# Patient Record
Sex: FEMALE | Race: WHITE | Employment: UNEMPLOYED | URBAN - NONMETROPOLITAN AREA
[De-identification: names, ages, dates, MRNs, and addresses within clinical notes are randomized per-mention and may not be internally consistent; named-entity substitution may affect disease eponyms.]

---

## 2019-06-06 ENCOUNTER — HOSPITAL ENCOUNTER (OUTPATIENT)
Dept: INFUSION THERAPY | Age: 54
Discharge: HOME | End: 2019-06-06
Payer: COMMERCIAL

## 2019-06-06 PROCEDURE — 86300 IMMUNOASSAY TUMOR CA 15-3: CPT

## 2019-06-06 PROCEDURE — 80053 COMPREHEN METABOLIC PANEL: CPT

## 2019-06-06 PROCEDURE — 36415 COLL VENOUS BLD VENIPUNCTURE: CPT

## 2019-06-14 ENCOUNTER — HOSPITAL ENCOUNTER (OUTPATIENT)
Dept: INFUSION THERAPY | Age: 54
Discharge: HOME | End: 2019-06-14
Payer: COMMERCIAL

## 2019-06-14 PROCEDURE — 85025 COMPLETE CBC W/AUTO DIFF WBC: CPT

## 2019-06-26 ENCOUNTER — OFFICE VISIT (OUTPATIENT)
Dept: OBSTETRICS AND GYNECOLOGY | Facility: CLINIC | Age: 54
End: 2019-06-26

## 2019-06-26 VITALS
BODY MASS INDEX: 24.17 KG/M2 | WEIGHT: 128 LBS | SYSTOLIC BLOOD PRESSURE: 102 MMHG | DIASTOLIC BLOOD PRESSURE: 68 MMHG | HEIGHT: 61 IN

## 2019-06-26 DIAGNOSIS — R53.83 FATIGUE, UNSPECIFIED TYPE: ICD-10-CM

## 2019-06-26 DIAGNOSIS — E55.9 VITAMIN D DEFICIENCY: ICD-10-CM

## 2019-06-26 DIAGNOSIS — Z12.4 CERVICAL CANCER SCREENING: ICD-10-CM

## 2019-06-26 DIAGNOSIS — R51.9 NONINTRACTABLE HEADACHE, UNSPECIFIED CHRONICITY PATTERN, UNSPECIFIED HEADACHE TYPE: ICD-10-CM

## 2019-06-26 DIAGNOSIS — Z01.419 WELL WOMAN EXAM WITH ROUTINE GYNECOLOGICAL EXAM: Primary | ICD-10-CM

## 2019-06-26 PROBLEM — C80.1 CARCINOSARCOMA (HCC): Status: ACTIVE | Noted: 2019-06-26

## 2019-06-26 PROCEDURE — 99396 PREV VISIT EST AGE 40-64: CPT | Performed by: NURSE PRACTITIONER

## 2019-06-26 PROCEDURE — G0123 SCREEN CERV/VAG THIN LAYER: HCPCS | Performed by: NURSE PRACTITIONER

## 2019-06-26 RX ORDER — FAMOTIDINE 20 MG/1
TABLET, FILM COATED ORAL
Refills: 1 | COMMUNITY
Start: 2019-05-02 | End: 2019-08-20 | Stop reason: ALTCHOICE

## 2019-06-26 RX ORDER — SUCRALFATE 1 G/1
TABLET ORAL
Refills: 5 | COMMUNITY
Start: 2019-04-24 | End: 2021-07-26

## 2019-06-26 RX ORDER — MELATONIN
1000 DAILY
COMMUNITY

## 2019-06-26 RX ORDER — PANTOPRAZOLE SODIUM 40 MG/1
TABLET, DELAYED RELEASE ORAL
Refills: 5 | COMMUNITY
Start: 2019-04-25 | End: 2019-08-20 | Stop reason: ALTCHOICE

## 2019-06-26 RX ORDER — M-VIT,TX,IRON,MINS/CALC/FOLIC 27MG-0.4MG
1 TABLET ORAL
COMMUNITY

## 2019-06-26 RX ORDER — UBIDECARENONE 100 MG
100 CAPSULE ORAL DAILY
COMMUNITY
End: 2021-07-26

## 2019-06-26 NOTE — PROGRESS NOTES
Jailyn Dickson is a 54 y.o. female  YOB: 1965        Chief Complaint   Patient presents with   • Gynecologic Exam     New patient here today for yearly exam. Last exam was done 04/2018 in [place] and was normal per patient. Patient has mammograms done at [facility] which are managed by Dr Liang. Patient voices no complaints or concerns today.        Gynecologic Exam   The patient's pertinent negatives include no genital itching, genital lesions, genital odor, genital rash, missed menses, pelvic pain, vaginal bleeding or vaginal discharge. Pertinent negatives include no abdominal pain, back pain, constipation, diarrhea, dysuria, fever, frequency, hematuria, nausea, rash, sore throat, urgency or vomiting.       The following portions of the patient's history were reviewed and updated as appropriate: allergies, current medications, past family history, past medical history, past social history, past surgical history and problem list.    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       Family History   Problem Relation Age of Onset   • Breast cancer Maternal Aunt    • Ovarian cancer Neg Hx    • Uterine cancer Neg Hx    • Colon cancer Neg Hx    • Melanoma Neg Hx        Social History     Socioeconomic History   • Marital status:      Spouse name: Not on file   • Number of children: Not on file   • Years of education: Not on file   • Highest education level: Not on file   Tobacco Use   • Smoking status: Never Smoker   • Smokeless tobacco: Never Used   Substance and Sexual Activity   • Alcohol use: No     Frequency: Never   • Drug use: No   • Sexual activity: Defer         Current Outpatient Medications:   •  cholecalciferol (VITAMIN D3) 1000 units tablet, Take 1,000 Units by mouth Daily., Disp: , Rfl:    •  coenzyme Q10 100 MG capsule, Take 100 mg by mouth Daily., Disp: , Rfl:   •  Evening Primrose Oil 1000 MG capsule, Take  by mouth., Disp: , Rfl:   •  famotidine (PEPCID) 20 MG tablet, , Disp: , Rfl: 1  •  pantoprazole (PROTONIX) 40 MG EC tablet, , Disp: , Rfl: 5  •  sucralfate (CARAFATE) 1 g tablet, , Disp: , Rfl: 5  •  therapeutic multivitamin-minerals (THERAGRAN-M) tablet, Take 1 tablet by mouth., Disp: , Rfl:     No LMP recorded. Patient is postmenopausal.    Sexual History:         Could not be calculated    Past Surgical History:   Procedure Laterality Date   • BREAST LUMPECTOMY Left    • CERVICAL CONIZATION, LEEP     •  SECTION      x2   • CHOLECYSTECTOMY     • PORTACATH PLACEMENT     • TONSILLECTOMY         Review of Systems   Constitutional: Negative for activity change, appetite change, fatigue, fever, unexpected weight gain and unexpected weight loss.   HENT: Negative for congestion, ear pain, hearing loss, nosebleeds, rhinorrhea, sore throat, tinnitus and trouble swallowing.    Eyes: Negative for blurred vision, pain, discharge, itching and visual disturbance.   Respiratory: Negative for apnea, chest tightness, shortness of breath and wheezing.    Cardiovascular: Negative for chest pain and leg swelling.   Gastrointestinal: Negative for abdominal pain, blood in stool, constipation, diarrhea, nausea, vomiting and GERD.   Endocrine: Negative for heat intolerance, polydipsia and polyuria.   Genitourinary: Negative for urinary incontinence, decreased libido, difficulty urinating, dyspareunia, dysuria, frequency, genital sores, hematuria, menstrual problem, missed menses, pelvic pain, urgency, vaginal discharge, vaginal pain and breast lump.   Musculoskeletal: Negative for arthralgias, back pain, joint swelling and myalgias.   Skin: Negative for color change, rash and skin lesions.   Allergic/Immunologic: Negative for environmental allergies, food allergies and immunocompromised state.    Neurological: Negative for dizziness, tremors, seizures, syncope, facial asymmetry, numbness and headache.   Hematological: Negative for adenopathy. Does not bruise/bleed easily.   Psychiatric/Behavioral: Negative for agitation, hallucinations, sleep disturbance, suicidal ideas and depressed mood. The patient is not nervous/anxious.        Objective   Physical Exam   Constitutional: She is oriented to person, place, and time. She appears well-developed and well-nourished. No distress.   HENT:   Head: Normocephalic.   Right Ear: External ear normal.   Left Ear: External ear normal.   Nose: Nose normal.   Mouth/Throat: Oropharynx is clear and moist.   Eyes: Conjunctivae are normal. Right eye exhibits no discharge. Left eye exhibits no discharge. No scleral icterus.   Neck: Normal range of motion. Neck supple. Carotid bruit is not present. No tracheal deviation present. No thyromegaly present.   Cardiovascular: Normal rate, regular rhythm, normal heart sounds and intact distal pulses.   No murmur heard.  Pulmonary/Chest: Effort normal and breath sounds normal. No respiratory distress. She has no wheezes. Right breast exhibits no inverted nipple, no mass, no nipple discharge, no skin change and no tenderness. Left breast exhibits no inverted nipple, no mass, no nipple discharge, no skin change and no tenderness. Breasts are symmetrical. There is no breast swelling.   Abdominal: Soft. She exhibits no distension and no mass. There is no tenderness. There is no guarding. No hernia. Hernia confirmed negative in the right inguinal area and confirmed negative in the left inguinal area.   Genitourinary: Rectum normal, vagina normal and uterus normal. Rectal exam shows no mass. No breast tenderness, discharge or bleeding. Pelvic exam was performed with patient supine. There is no rash, tenderness, lesion or injury on the right labia. There is no rash, tenderness, lesion or injury on the left labia. Uterus is not enlarged, not  "fixed and not tender. Cervix exhibits no motion tenderness, no discharge and no friability. Right adnexum displays no mass, no tenderness and no fullness. Left adnexum displays no mass, no tenderness and no fullness. No erythema, tenderness or bleeding in the vagina. No foreign body in the vagina. No signs of injury around the vagina. No vaginal discharge found.   Genitourinary Comments:   BSU normal  Urethral meatus  Normal  Perineum  Normal   Musculoskeletal: Normal range of motion. She exhibits no edema or tenderness.   Lymphadenopathy:        Head (right side): No submental, no submandibular, no tonsillar, no preauricular, no posterior auricular and no occipital adenopathy present.        Head (left side): No submental, no submandibular, no tonsillar, no preauricular, no posterior auricular and no occipital adenopathy present.     She has no cervical adenopathy.        Right cervical: No superficial cervical, no deep cervical and no posterior cervical adenopathy present.       Left cervical: No superficial cervical, no deep cervical and no posterior cervical adenopathy present.     She has no axillary adenopathy.        Right: No inguinal adenopathy present.        Left: No inguinal adenopathy present.   Neurological: She is alert and oriented to person, place, and time. Coordination normal.   Skin: Skin is warm and dry. No bruising and no rash noted. She is not diaphoretic. No erythema.   Psychiatric: She has a normal mood and affect. Her behavior is normal. Judgment and thought content normal.   Nursing note and vitals reviewed.        Vitals:    06/26/19 1357   BP: 102/68   Weight: 58.1 kg (128 lb)   Height: 153.7 cm (60.5\")       Rebekah was seen today for gynecologic exam.    Diagnoses and all orders for this visit:    Well woman exam with routine gynecological exam  Comments:  New patient here today for well woman exam.  Well woman exam normal.  ThinPrep Pap smear done.  Mammogram ordered and managed by Dr." "Azul.  Orders:  -     Liquid-based Pap Smear, Screening  -     Vitamin D 25 Hydroxy  -     T4, Free  -     Hemoglobin A1c  -     CBC & Differential  -     Comprehensive Metabolic Panel  -     Lipid Panel With LDL / HDL Ratio  -     TSH    Cervical cancer screening  Comments:  ThinPrep Pap smear done.  Orders:  -     Liquid-based Pap Smear, Screening    Nonintractable headache, unspecified chronicity pattern, unspecified headache type  Comments:  Patient reports she has had \"head pain\" for years.  Requests referral to neurologist.  Referral made.  Orders:  -     Ambulatory Referral to Neurology    Vitamin D deficiency  Comments:  Vitamin D level checked today.  Orders:  -     Vitamin D 25 Hydroxy    Fatigue, unspecified type  Comments:  Yearly lab panel done-follow-up pending results.  Orders:  -     Vitamin D 25 Hydroxy  -     T4, Free  -     Hemoglobin A1c  -     CBC & Differential  -     Comprehensive Metabolic Panel  -     Lipid Panel With LDL / HDL Ratio  -     TSH          Patient's Body mass index is 24.59 kg/m². BMI is within normal parameters. No follow-up required..             Non-Smoker    MyChart Instructions Given       "

## 2019-06-27 LAB
25(OH)D3+25(OH)D2 SERPL-MCNC: 43.9 NG/ML (ref 30–100)
ALBUMIN SERPL-MCNC: 4.7 G/DL (ref 3.5–5.2)
ALBUMIN/GLOB SERPL: 1.6 G/DL
ALP SERPL-CCNC: 77 U/L (ref 39–117)
ALT SERPL-CCNC: 18 U/L (ref 1–33)
AST SERPL-CCNC: 18 U/L (ref 1–32)
BASOPHILS # BLD AUTO: 0.05 10*3/MM3 (ref 0–0.2)
BASOPHILS NFR BLD AUTO: 0.6 % (ref 0–1.5)
BILIRUB SERPL-MCNC: 1 MG/DL (ref 0.2–1.2)
BUN SERPL-MCNC: 19 MG/DL (ref 6–20)
BUN/CREAT SERPL: 29.7 (ref 7–25)
CALCIUM SERPL-MCNC: 10 MG/DL (ref 8.6–10.5)
CHLORIDE SERPL-SCNC: 103 MMOL/L (ref 98–107)
CHOLEST SERPL-MCNC: 212 MG/DL (ref 0–200)
CO2 SERPL-SCNC: 27.2 MMOL/L (ref 22–29)
CREAT SERPL-MCNC: 0.64 MG/DL (ref 0.57–1)
EOSINOPHIL # BLD AUTO: 0.13 10*3/MM3 (ref 0–0.4)
EOSINOPHIL NFR BLD AUTO: 1.6 % (ref 0.3–6.2)
ERYTHROCYTE [DISTWIDTH] IN BLOOD BY AUTOMATED COUNT: 13.4 % (ref 12.3–15.4)
GEN CATEG CVX/VAG CYTO-IMP: NORMAL
GLOBULIN SER CALC-MCNC: 3 GM/DL
GLUCOSE SERPL-MCNC: 85 MG/DL (ref 65–99)
HBA1C MFR BLD: 5.6 % (ref 4.8–5.6)
HCT VFR BLD AUTO: 41.9 % (ref 34–46.6)
HDLC SERPL-MCNC: 57 MG/DL (ref 40–60)
HGB BLD-MCNC: 12.7 G/DL (ref 12–15.9)
IMM GRANULOCYTES # BLD AUTO: 0 10*3/MM3 (ref 0–0.05)
IMM GRANULOCYTES NFR BLD AUTO: 0 % (ref 0–0.5)
LAB AP CASE REPORT: NORMAL
LAB AP GYN ADDITIONAL INFORMATION: NORMAL
LAB AP GYN OTHER FINDINGS: NORMAL
LDLC SERPL CALC-MCNC: 131 MG/DL (ref 0–100)
LDLC/HDLC SERPL: 2.3 {RATIO}
LYMPHOCYTES # BLD AUTO: 3.31 10*3/MM3 (ref 0.7–3.1)
LYMPHOCYTES NFR BLD AUTO: 40.5 % (ref 19.6–45.3)
MCH RBC QN AUTO: 27.8 PG (ref 26.6–33)
MCHC RBC AUTO-ENTMCNC: 30.3 G/DL (ref 31.5–35.7)
MCV RBC AUTO: 91.7 FL (ref 79–97)
MONOCYTES # BLD AUTO: 0.6 10*3/MM3 (ref 0.1–0.9)
MONOCYTES NFR BLD AUTO: 7.3 % (ref 5–12)
NEUTROPHILS # BLD AUTO: 4.09 10*3/MM3 (ref 1.7–7)
NEUTROPHILS NFR BLD AUTO: 50 % (ref 42.7–76)
PATH INTERP SPEC-IMP: NORMAL
PLATELET # BLD AUTO: 331 10*3/MM3 (ref 140–450)
POTASSIUM SERPL-SCNC: 4.1 MMOL/L (ref 3.5–5.2)
PROT SERPL-MCNC: 7.7 G/DL (ref 6–8.5)
RBC # BLD AUTO: 4.57 10*6/MM3 (ref 3.77–5.28)
SODIUM SERPL-SCNC: 143 MMOL/L (ref 136–145)
STAT OF ADQ CVX/VAG CYTO-IMP: NORMAL
T4 FREE SERPL-MCNC: 1.16 NG/DL (ref 0.93–1.7)
TRIGL SERPL-MCNC: 120 MG/DL (ref 0–150)
TSH SERPL DL<=0.005 MIU/L-ACNC: 1.05 MIU/ML (ref 0.27–4.2)
VLDLC SERPL CALC-MCNC: 24 MG/DL
WBC # BLD AUTO: 8.18 10*3/MM3 (ref 3.4–10.8)

## 2019-08-05 ENCOUNTER — TELEPHONE (OUTPATIENT)
Dept: NEUROLOGY | Age: 54
End: 2019-08-05

## 2019-08-20 ENCOUNTER — OFFICE VISIT (OUTPATIENT)
Dept: CARDIOLOGY | Facility: CLINIC | Age: 54
End: 2019-08-20

## 2019-08-20 ENCOUNTER — DOCUMENTATION (OUTPATIENT)
Dept: CARDIOLOGY | Facility: CLINIC | Age: 54
End: 2019-08-20

## 2019-08-20 VITALS
WEIGHT: 133 LBS | BODY MASS INDEX: 25.11 KG/M2 | HEART RATE: 70 BPM | SYSTOLIC BLOOD PRESSURE: 104 MMHG | DIASTOLIC BLOOD PRESSURE: 60 MMHG | HEIGHT: 61 IN

## 2019-08-20 DIAGNOSIS — R07.89 CHEST PAIN, ATYPICAL: Primary | ICD-10-CM

## 2019-08-20 PROCEDURE — 93000 ELECTROCARDIOGRAM COMPLETE: CPT | Performed by: INTERNAL MEDICINE

## 2019-08-20 PROCEDURE — 99204 OFFICE O/P NEW MOD 45 MIN: CPT | Performed by: INTERNAL MEDICINE

## 2019-08-20 RX ORDER — CHLORAL HYDRATE 500 MG
1000 CAPSULE ORAL
COMMUNITY

## 2019-08-20 RX ORDER — AMPICILLIN 500 MG/1
500 CAPSULE ORAL 4 TIMES DAILY
COMMUNITY
End: 2021-07-26

## 2019-08-20 NOTE — PROGRESS NOTES
[facility] - CARDIOLOGY  New Patient Initial Outpatient Evaulation    Primary Care Physician: Padilla Madison MD    Subjective     Chief Complaint: chest pain    History of Present Illness  54-year-old female with a history of breast cancer referred to me by BARRERA Hickman, for evaluation of chest pain.  According to her note with the patient from 2019, the pain was described as radiating upper chest and the right side of her jaw with associated nausea.  Apparently last occurred several weeks prior to that appointment.  Of note, she is known to have reflux esophagitis.  Her sister, who had no known CAD,  suddenly of presumed MI ~2 years ago.    Patient tells me today that the episode in question occurred  while she was getting ready for the day. She had an acute onset of right jaw pain, then quickly noticed a tightness in her chest. Lasted for a matter of minutes - was persistent at a moderate degree of severity (6-7 out of 10) without any associated symptoms of dyspnea, palpitations, nausea, or diaphoresis. Nothing like that has recurred since.    Review of Systems   Constitution: Negative.   HENT: Negative for nosebleeds.    Eyes: Negative.    Cardiovascular: Positive for chest pain. Negative for claudication, dyspnea on exertion, irregular heartbeat, leg swelling, near-syncope, orthopnea, palpitations, paroxysmal nocturnal dyspnea and syncope.   Respiratory: Negative.  Negative for cough, shortness of breath and wheezing.    Endocrine: Negative.    Hematologic/Lymphatic: Negative.  Negative for bleeding problem. Does not bruise/bleed easily.   Skin: Negative.    Musculoskeletal: Positive for neck pain.   Gastrointestinal: Negative.  Negative for dysphagia, hematemesis, hematochezia and melena.   Genitourinary: Negative.  Negative for hematuria and non-menstrual bleeding.   Neurological: Positive for headaches.   Psychiatric/Behavioral: Negative.    Allergic/Immunologic: Negative.         Otherwise  "complete ROS reviewed and negative except as mentioned in the HPI.      Past Medical History:   Past Medical History:   Diagnosis Date   • Breast cancer (CMS/HCC)    • Carcinoma in situ of female breast    • Carcinosarcoma (CMS/HCC) 2000   • Heart murmur    • Hyperlipidemia        Past Surgical History:  Past Surgical History:   Procedure Laterality Date   • BREAST LUMPECTOMY Left    • CERVICAL CONIZATION, LEEP     •  SECTION      x2   • CHOLECYSTECTOMY     • PORTACATH PLACEMENT     • TONSILLECTOMY         Family History: family history includes Breast cancer in her maternal aunt; Heart attack in her sister; No Known Problems in her father and mother.    Social History:  reports that she has never smoked. She has never used smokeless tobacco. She reports that she does not drink alcohol or use drugs.    Medications:  Prior to Admission medications    Medication Sig Start Date End Date Taking? Authorizing Provider   cholecalciferol (VITAMIN D3) 1000 units tablet Take 1,000 Units by mouth Daily.    Leanna Chavis MD   coenzyme Q10 100 MG capsule Take 100 mg by mouth Daily.    Leanna Chavis MD   Evening Primrose Oil 1000 MG capsule Take  by mouth.    Leanna Chavis MD   famotidine (PEPCID) 20 MG tablet  19   Leanna Chavis MD   pantoprazole (PROTONIX) 40 MG EC tablet  19   Leanna Chavis MD   sucralfate (CARAFATE) 1 g tablet  19   Leanna Chavis MD   therapeutic multivitamin-minerals (THERAGRAN-M) tablet Take 1 tablet by mouth.    Leanna Chavis MD     Allergies:  Allergies   Allergen Reactions   • Codeine Hallucinations     hallucinations       Objective     Vital Signs: /60 (BP Location: Right arm, Patient Position: Sitting)   Pulse 70   Ht 154.9 cm (61\")   Wt 60.3 kg (133 lb)   BMI 25.13 kg/m²     Physical Exam   Constitutional: No distress.   HENT:   Mouth/Throat: Oropharynx is clear. Pharynx is normal.   Neck: Normal range of " motion and thyroid normal. Neck supple. No JVD present. No thyromegaly present.   Cardiovascular: Normal rate, regular rhythm, S1 normal, S2 normal, normal heart sounds, intact distal pulses and normal pulses.  No extrasystoles are present. PMI is not displaced.   Pulmonary/Chest: Effort normal and breath sounds normal.   Abdominal: Soft. Bowel sounds are normal. She exhibits no distension. There is no splenomegaly or hepatomegaly. There is no tenderness.   Musculoskeletal: She exhibits no edema or tenderness.   Neurological: She is alert and oriented to person, place, and time.   Skin: Skin is warm and dry.       Results Reviewed:      ECG 12 Lead  Date/Time: 8/20/2019 3:22 PM  Performed by: Stuart Lopez MD  Authorized by: Stuart Lopez MD   Comparison: not compared with previous ECG   Previous ECG: no previous ECG available  Rhythm: sinus rhythm  BPM: 70  QRS axis: right  Other findings: non-specific ST-T wave changes    Clinical impression: non-specific ECG              Lab Results   Component Value Date    TRIG 120 06/26/2019    HDL 57 06/26/2019    VLDL 24 06/26/2019    LDLHDL 2.30 06/26/2019     Lab Results   Component Value Date    HGBA1C 5.60 06/26/2019   Old records reviewed: I reviewed the office visit notes with Ms. Donte Dexter from 8/1/2019, as well as 6/13/2019.  From these I learned the patient has gastroparesis and reflux esophagitis.  Exercise stress test performed in 2017 is interpreted by Dr. Renner was low risk, she exercised greater than 10 minutes and had no ischemic EKG changes reported.    Labs reviewed, from 8/1/2019: BMP unremarkable.    Imaging reviewed: Delayed gastric emptying demonstrated on nuclear medicine gastric emptying study performed 5/18/2019.    Assessment / Plan        Problem List Items Addressed This Visit        Nervous and Auditory    Chest pain, atypical - Primary    Relevant Orders    ECG 12 Lead    Adult Stress Echo W/ Cont or Stress Agent if Necessary Per  Protocol        Plans:  -if stress test low risk, no cardiology f/u will be required and I will recommend she continue f/u with Dr. Madison for on-going primary prevention (screening, risk factor modification, etc)    Stuart Lopez MD   08/20/19   11:31 PM

## 2019-08-29 ENCOUNTER — APPOINTMENT (OUTPATIENT)
Dept: CARDIOLOGY | Facility: HOSPITAL | Age: 54
End: 2019-08-29

## 2019-09-03 ENCOUNTER — APPOINTMENT (OUTPATIENT)
Dept: CARDIOLOGY | Facility: HOSPITAL | Age: 54
End: 2019-09-03

## 2019-09-03 ENCOUNTER — TELEPHONE (OUTPATIENT)
Dept: CARDIOLOGY | Facility: CLINIC | Age: 54
End: 2019-09-03

## 2019-10-02 ENCOUNTER — OFFICE VISIT (OUTPATIENT)
Dept: NEUROSURGERY | Age: 54
End: 2019-10-02
Payer: COMMERCIAL

## 2019-10-02 VITALS
WEIGHT: 133.4 LBS | BODY MASS INDEX: 25.19 KG/M2 | HEART RATE: 75 BPM | OXYGEN SATURATION: 98 % | HEIGHT: 61 IN | DIASTOLIC BLOOD PRESSURE: 60 MMHG | SYSTOLIC BLOOD PRESSURE: 120 MMHG

## 2019-10-02 DIAGNOSIS — R51.9 HEADACHE, UNSPECIFIED HEADACHE TYPE: Primary | ICD-10-CM

## 2019-10-02 DIAGNOSIS — R51.9 HEADACHE, UNSPECIFIED HEADACHE TYPE: ICD-10-CM

## 2019-10-02 DIAGNOSIS — M54.2 NECK PAIN: ICD-10-CM

## 2019-10-02 LAB
ALBUMIN SERPL-MCNC: 5 G/DL (ref 3.5–5.2)
ALP BLD-CCNC: 77 U/L (ref 35–104)
ALT SERPL-CCNC: 33 U/L (ref 5–33)
ANION GAP SERPL CALCULATED.3IONS-SCNC: 17 MMOL/L (ref 7–19)
AST SERPL-CCNC: 30 U/L (ref 5–32)
BASOPHILS ABSOLUTE: 0.1 K/UL (ref 0–0.2)
BASOPHILS RELATIVE PERCENT: 0.9 % (ref 0–1)
BILIRUB SERPL-MCNC: 1.1 MG/DL (ref 0.2–1.2)
BUN BLDV-MCNC: 18 MG/DL (ref 6–20)
C-REACTIVE PROTEIN: 0.38 MG/DL (ref 0–0.5)
CALCIUM SERPL-MCNC: 9.9 MG/DL (ref 8.6–10)
CHLORIDE BLD-SCNC: 103 MMOL/L (ref 98–111)
CO2: 24 MMOL/L (ref 22–29)
CREAT SERPL-MCNC: 0.5 MG/DL (ref 0.5–0.9)
EOSINOPHILS ABSOLUTE: 0.1 K/UL (ref 0–0.6)
EOSINOPHILS RELATIVE PERCENT: 1.6 % (ref 0–5)
GFR NON-AFRICAN AMERICAN: >60
GLUCOSE BLD-MCNC: 98 MG/DL (ref 74–109)
HCT VFR BLD CALC: 44.1 % (ref 37–47)
HEMOGLOBIN: 14.1 G/DL (ref 12–16)
IMMATURE GRANULOCYTES #: 0 K/UL
LYMPHOCYTES ABSOLUTE: 2.3 K/UL (ref 1.1–4.5)
LYMPHOCYTES RELATIVE PERCENT: 27.5 % (ref 20–40)
MCH RBC QN AUTO: 28.4 PG (ref 27–31)
MCHC RBC AUTO-ENTMCNC: 32 G/DL (ref 33–37)
MCV RBC AUTO: 88.9 FL (ref 81–99)
MONOCYTES ABSOLUTE: 0.6 K/UL (ref 0–0.9)
MONOCYTES RELATIVE PERCENT: 7.1 % (ref 0–10)
NEUTROPHILS ABSOLUTE: 5.1 K/UL (ref 1.5–7.5)
NEUTROPHILS RELATIVE PERCENT: 62.7 % (ref 50–65)
PDW BLD-RTO: 12.6 % (ref 11.5–14.5)
PLATELET # BLD: 343 K/UL (ref 130–400)
PMV BLD AUTO: 8.9 FL (ref 9.4–12.3)
POTASSIUM SERPL-SCNC: 4.4 MMOL/L (ref 3.5–5)
RBC # BLD: 4.96 M/UL (ref 4.2–5.4)
RHEUMATOID FACTOR: <10 IU/ML
SEDIMENTATION RATE, ERYTHROCYTE: 11 MM/HR (ref 0–25)
SODIUM BLD-SCNC: 144 MMOL/L (ref 136–145)
T4 FREE: 1.1 NG/DL (ref 0.9–1.7)
TOTAL PROTEIN: 8.3 G/DL (ref 6.6–8.7)
TSH SERPL DL<=0.05 MIU/L-ACNC: 1.09 UIU/ML (ref 0.27–4.2)
VITAMIN B-12: 1317 PG/ML (ref 211–946)
WBC # BLD: 8.2 K/UL (ref 4.8–10.8)

## 2019-10-02 PROCEDURE — 99204 OFFICE O/P NEW MOD 45 MIN: CPT | Performed by: PSYCHIATRY & NEUROLOGY

## 2019-10-02 RX ORDER — ESCITALOPRAM OXALATE 10 MG/1
10 TABLET ORAL DAILY
Qty: 30 TABLET | Refills: 5 | Status: SHIPPED | OUTPATIENT
Start: 2019-10-02 | End: 2020-03-27

## 2019-10-02 RX ORDER — GABAPENTIN 300 MG/1
300 CAPSULE ORAL 3 TIMES DAILY
Qty: 90 CAPSULE | Refills: 5 | Status: SHIPPED | OUTPATIENT
Start: 2019-10-02 | End: 2020-04-01

## 2019-10-03 LAB — RPR: NORMAL

## 2019-10-05 LAB
ANA IGG, ELISA: NORMAL
LYME (B. BURGDORFERI) AB IGG WB: NEGATIVE
LYME AB IGM BY WB:: NEGATIVE

## 2019-10-06 LAB
ALBUMIN SERPL-MCNC: 4.7 G/DL (ref 3.75–5.01)
ALPHA-1-GLOBULIN: 0.25 G/DL (ref 0.19–0.46)
ALPHA-2-GLOBULIN: 0.69 G/DL (ref 0.48–1.05)
BETA GLOBULIN: 0.93 G/DL (ref 0.48–1.1)
GAMMA GLOBULIN: 1.12 G/DL (ref 0.62–1.51)
PROTEIN ELECTROPHORESIS, SERUM: NORMAL
SPE/IFE INTERPRETATION: NORMAL
TOTAL PROTEIN: 7.7 G/DL (ref 6.3–8.2)

## 2019-10-10 ENCOUNTER — TELEPHONE (OUTPATIENT)
Dept: NEUROLOGY | Age: 54
End: 2019-10-10

## 2019-10-16 ENCOUNTER — TELEPHONE (OUTPATIENT)
Dept: NEUROLOGY | Age: 54
End: 2019-10-16

## 2019-10-30 ENCOUNTER — TELEPHONE (OUTPATIENT)
Dept: NEUROSURGERY | Age: 54
End: 2019-10-30

## 2019-11-27 ENCOUNTER — OFFICE VISIT (OUTPATIENT)
Dept: NEUROSURGERY | Age: 54
End: 2019-11-27
Payer: COMMERCIAL

## 2019-11-27 VITALS
OXYGEN SATURATION: 98 % | BODY MASS INDEX: 26.74 KG/M2 | HEIGHT: 60 IN | WEIGHT: 136.2 LBS | SYSTOLIC BLOOD PRESSURE: 128 MMHG | HEART RATE: 74 BPM | DIASTOLIC BLOOD PRESSURE: 74 MMHG

## 2019-11-27 DIAGNOSIS — R51.9 HEADACHE, UNSPECIFIED HEADACHE TYPE: Primary | ICD-10-CM

## 2019-11-27 DIAGNOSIS — M54.2 NECK PAIN: ICD-10-CM

## 2019-11-27 PROCEDURE — 99214 OFFICE O/P EST MOD 30 MIN: CPT | Performed by: PSYCHIATRY & NEUROLOGY

## 2019-11-27 RX ORDER — LORAZEPAM 1 MG/1
TABLET ORAL
Qty: 2 TABLET | Refills: 0 | Status: SHIPPED | OUTPATIENT
Start: 2019-11-27 | End: 2019-12-10 | Stop reason: SDUPTHER

## 2019-11-27 RX ORDER — SUCRALFATE 1 G/1
1 TABLET ORAL 2 TIMES DAILY
Refills: 2 | COMMUNITY
Start: 2019-10-31 | End: 2020-06-12

## 2019-12-06 ENCOUNTER — HOSPITAL ENCOUNTER (OUTPATIENT)
Dept: INFUSION THERAPY | Age: 54
Discharge: HOME | End: 2019-12-06
Payer: COMMERCIAL

## 2019-12-06 DIAGNOSIS — Z85.3 PERSONAL HISTORY OF BREAST CANCER: Primary | ICD-10-CM

## 2019-12-06 DIAGNOSIS — Z85.3 PERSONAL HISTORY OF BREAST CANCER: ICD-10-CM

## 2019-12-06 PROCEDURE — 36415 COLL VENOUS BLD VENIPUNCTURE: CPT

## 2019-12-06 PROCEDURE — 80053 COMPREHEN METABOLIC PANEL: CPT

## 2019-12-09 ENCOUNTER — TELEPHONE (OUTPATIENT)
Dept: NEUROSURGERY | Age: 54
End: 2019-12-09

## 2019-12-10 ENCOUNTER — TELEPHONE (OUTPATIENT)
Dept: NEUROSURGERY | Age: 54
End: 2019-12-10

## 2019-12-10 DIAGNOSIS — R51.9 HEADACHE, UNSPECIFIED HEADACHE TYPE: ICD-10-CM

## 2019-12-10 DIAGNOSIS — M54.2 NECK PAIN: ICD-10-CM

## 2019-12-10 RX ORDER — LORAZEPAM 1 MG/1
TABLET ORAL
Qty: 2 TABLET | Refills: 0 | Status: SHIPPED | OUTPATIENT
Start: 2019-12-10 | End: 2019-12-18 | Stop reason: SDUPTHER

## 2019-12-11 ENCOUNTER — TELEPHONE (OUTPATIENT)
Dept: NEUROSURGERY | Age: 54
End: 2019-12-11

## 2019-12-13 ENCOUNTER — TELEPHONE (OUTPATIENT)
Dept: NEUROSURGERY | Age: 54
End: 2019-12-13

## 2019-12-13 ENCOUNTER — OFFICE VISIT (OUTPATIENT)
Dept: HEMATOLOGY | Age: 54
End: 2019-12-13
Payer: COMMERCIAL

## 2019-12-13 ENCOUNTER — HOSPITAL ENCOUNTER (OUTPATIENT)
Dept: INFUSION THERAPY | Age: 54
Discharge: HOME | End: 2019-12-13
Payer: COMMERCIAL

## 2019-12-13 VITALS
BODY MASS INDEX: 26.96 KG/M2 | OXYGEN SATURATION: 95 % | HEIGHT: 60 IN | DIASTOLIC BLOOD PRESSURE: 80 MMHG | SYSTOLIC BLOOD PRESSURE: 126 MMHG | HEART RATE: 91 BPM | WEIGHT: 137.3 LBS

## 2019-12-13 DIAGNOSIS — Z85.3 PERSONAL HISTORY OF BREAST CANCER: ICD-10-CM

## 2019-12-13 DIAGNOSIS — Z85.3 PERSONAL HISTORY OF BREAST CANCER: Primary | ICD-10-CM

## 2019-12-13 DIAGNOSIS — R97.8 ELEVATED CA 15-3 LEVEL: ICD-10-CM

## 2019-12-13 DIAGNOSIS — M85.88 OSTEOPENIA OF LUMBAR SPINE: ICD-10-CM

## 2019-12-13 PROCEDURE — 99213 OFFICE O/P EST LOW 20 MIN: CPT | Performed by: NURSE PRACTITIONER

## 2019-12-13 PROCEDURE — 85025 COMPLETE CBC W/AUTO DIFF WBC: CPT

## 2019-12-13 PROCEDURE — 99211 OFF/OP EST MAY X REQ PHY/QHP: CPT

## 2019-12-15 ASSESSMENT — ENCOUNTER SYMPTOMS
NAUSEA: 0
ABDOMINAL PAIN: 0
DIARRHEA: 0
VOMITING: 0
COUGH: 0
SHORTNESS OF BREATH: 0
EYE ITCHING: 0
EYE REDNESS: 0
EYE DISCHARGE: 0
CONSTIPATION: 0
TROUBLE SWALLOWING: 0
SORE THROAT: 0
PHOTOPHOBIA: 0
WHEEZING: 0

## 2019-12-18 ENCOUNTER — HOSPITAL ENCOUNTER (OUTPATIENT)
Dept: MRI IMAGING | Age: 54
Discharge: HOME | End: 2019-12-18
Payer: COMMERCIAL

## 2019-12-18 DIAGNOSIS — M54.2 NECK PAIN: ICD-10-CM

## 2019-12-18 DIAGNOSIS — R51.9 HEADACHE, UNSPECIFIED HEADACHE TYPE: ICD-10-CM

## 2019-12-18 PROCEDURE — A9577 INJ MULTIHANCE: HCPCS | Performed by: PSYCHIATRY & NEUROLOGY

## 2019-12-18 PROCEDURE — 6360000004 HC RX CONTRAST MEDICATION: Performed by: PSYCHIATRY & NEUROLOGY

## 2019-12-18 PROCEDURE — 70553 MRI BRAIN STEM W/O & W/DYE: CPT

## 2019-12-18 RX ORDER — LORAZEPAM 1 MG/1
TABLET ORAL
Qty: 2 TABLET | Refills: 0 | Status: SHIPPED | OUTPATIENT
Start: 2019-12-18 | End: 2020-01-17

## 2019-12-18 RX ADMIN — GADOBENATE DIMEGLUMINE 12 ML: 529 INJECTION, SOLUTION INTRAVENOUS at 07:56

## 2019-12-19 ENCOUNTER — HOSPITAL ENCOUNTER (OUTPATIENT)
Dept: MRI IMAGING | Age: 54
Discharge: HOME | End: 2019-12-19
Payer: COMMERCIAL

## 2019-12-19 DIAGNOSIS — R51.9 HEADACHE, UNSPECIFIED HEADACHE TYPE: ICD-10-CM

## 2019-12-19 DIAGNOSIS — M54.2 NECK PAIN: ICD-10-CM

## 2019-12-19 PROCEDURE — 72141 MRI NECK SPINE W/O DYE: CPT

## 2019-12-19 PROCEDURE — 70544 MR ANGIOGRAPHY HEAD W/O DYE: CPT

## 2020-01-24 VITALS
HEART RATE: 80 BPM | WEIGHT: 131 LBS | DIASTOLIC BLOOD PRESSURE: 78 MMHG | SYSTOLIC BLOOD PRESSURE: 116 MMHG | BODY MASS INDEX: 25.58 KG/M2 | OXYGEN SATURATION: 98 %

## 2020-01-24 RX ORDER — NYSTATIN 100000 [USP'U]/G
POWDER TOPICAL 4 TIMES DAILY
COMMUNITY
End: 2020-06-12 | Stop reason: ALTCHOICE

## 2020-02-07 ENCOUNTER — HOSPITAL ENCOUNTER (OUTPATIENT)
Dept: INFUSION THERAPY | Age: 55
Discharge: HOME | End: 2020-02-07
Payer: COMMERCIAL

## 2020-02-07 DIAGNOSIS — R97.8 ELEVATED CA 15-3 LEVEL: ICD-10-CM

## 2020-02-07 PROCEDURE — 86300 IMMUNOASSAY TUMOR CA 15-3: CPT

## 2020-02-07 PROCEDURE — 80053 COMPREHEN METABOLIC PANEL: CPT

## 2020-02-07 PROCEDURE — 36415 COLL VENOUS BLD VENIPUNCTURE: CPT

## 2020-02-26 ENCOUNTER — TELEPHONE (OUTPATIENT)
Dept: NEUROSURGERY | Age: 55
End: 2020-02-26

## 2020-02-26 NOTE — TELEPHONE ENCOUNTER
Called spoke with patient about changing an appointment with Dr Giselle Wismean, patient is aware of the appointment change.

## 2020-02-27 ENCOUNTER — TELEPHONE (OUTPATIENT)
Dept: NEUROSURGERY | Age: 55
End: 2020-02-27

## 2020-02-27 NOTE — TELEPHONE ENCOUNTER
----- Message from ЕЛЕНА Fernandes sent at 2/27/2020  9:29 AM CST -----  Regarding: RE: MRI results  MRI brain looks overall unremarkable. MRA head normal. MRI cervical spine with mild degenerative disc disease.   ----- Message -----  From: Gabriel Steward MA  Sent: 2/27/2020   8:50 AM CST  To: ЕЛЕНА Fernandes  Subject: MRI results                                      Patient would like the results of her MRI. Thanks.

## 2020-05-14 ENCOUNTER — TELEPHONE (OUTPATIENT)
Dept: NEUROSURGERY | Age: 55
End: 2020-05-14

## 2020-05-18 ENCOUNTER — OFFICE VISIT (OUTPATIENT)
Dept: NEUROSURGERY | Age: 55
End: 2020-05-18
Payer: COMMERCIAL

## 2020-05-18 VITALS
HEART RATE: 72 BPM | WEIGHT: 135 LBS | BODY MASS INDEX: 25.49 KG/M2 | DIASTOLIC BLOOD PRESSURE: 78 MMHG | HEIGHT: 61 IN | OXYGEN SATURATION: 98 % | SYSTOLIC BLOOD PRESSURE: 131 MMHG

## 2020-05-18 PROCEDURE — 99214 OFFICE O/P EST MOD 30 MIN: CPT | Performed by: PSYCHIATRY & NEUROLOGY

## 2020-05-18 RX ORDER — ESCITALOPRAM OXALATE 10 MG/1
TABLET ORAL
Qty: 90 TABLET | Refills: 3 | Status: SHIPPED | OUTPATIENT
Start: 2020-05-18 | End: 2020-11-16 | Stop reason: SDUPTHER

## 2020-05-18 NOTE — PROGRESS NOTES
lung    Diabetes Maternal Grandfather     Heart Disease Maternal Grandfather     Other Sister         cholecystitis       Social History     Socioeconomic History    Marital status:      Spouse name: Not on file    Number of children: Not on file    Years of education: Not on file    Highest education level: Not on file   Occupational History    Not on file   Social Needs    Financial resource strain: Not on file    Food insecurity     Worry: Not on file     Inability: Not on file    Transportation needs     Medical: Not on file     Non-medical: Not on file   Tobacco Use    Smoking status: Never Smoker    Smokeless tobacco: Never Used   Substance and Sexual Activity    Alcohol use: No    Drug use: No    Sexual activity: Yes     Partners: Male   Lifestyle    Physical activity     Days per week: Not on file     Minutes per session: Not on file    Stress: Not on file   Relationships    Social connections     Talks on phone: Not on file     Gets together: Not on file     Attends Zoroastrianism service: Not on file     Active member of club or organization: Not on file     Attends meetings of clubs or organizations: Not on file     Relationship status: Not on file    Intimate partner violence     Fear of current or ex partner: Not on file     Emotionally abused: Not on file     Physically abused: Not on file     Forced sexual activity: Not on file   Other Topics Concern    Not on file   Social History Narrative    Not on file       Current Outpatient Medications   Medication Sig Dispense Refill    gabapentin (NEURONTIN) 300 MG capsule TAKE 1 CAPSULE BY MOUTH THREE TIMES DAILY 90 capsule 5    escitalopram (LEXAPRO) 10 MG tablet Take 1 tablet by mouth once daily 30 tablet 5    nystatin (MYCOSTATIN) 697293 UNIT/GM powder Apply topically 4 times daily Apply topically 4 times daily.       sucralfate (CARAFATE) 1 GM tablet Take 1 tablet by mouth 2 times daily  2    NONFORMULARY Indications: Depression [x] Anxiety [x] Denies all unless marked  Genitourinary:   [] Frequency  [] Urgency  [] Incontinence [] Pain with Urination  [x] Denies all unless marked  Skin:[] Rash [] Skin Discoloration [x] Denies all unless marked      I have reviewed the above ROS with the patient and agree with the ROS as documented above. PHYSICAL EXAM    Constitutional -   /78   Pulse 72   Ht 5' 1\" (1.549 m)   Wt 135 lb (61.2 kg)   SpO2 98%   BMI 25.51 kg/m²   General appearance: No acute distress   EYES -   Conjunctiva normal  Pupillary exam as below, see CN exam in the neurologic exam  ENT-    No scars, masses, or lesions over external nose or ears  Hearing normal bilaterally to finger rub  Cardiovascular -   No clubbing, cyanosis, or edema   Pulmonary-   Good expansion, normal effort without use of accessory muscles  Musculoskeletal -   No significant wasting of muscles noted  Gait as below, see gait exam in the neurologic exam  Muscle strength, tone, stability as below. No bony deformities  Skin -   Warm, dry, and intact to inspection and palpation. No rash, erythema, or pallor  Psychiatric -   Mood, affect, and behavior appear normal    Memory as below see mental status examination in the neurologic exam    NEUROLOGICAL EXAM    Mental status   [x]Awake, alert, oriented   [x]Affect attention and concentration appear appropriate  [x]Recent and remote memory appears unremarkable  [x]Speech normal without dysarthria or aphasia, comprehension and repetition intact.    COMMENTS:    Cranial Nerves [x]No VF deficit to confrontation,  no papilledema on fundoscopic exam.  [x]PERRLA, EOMI, no nystagmus, conjugate eye movements, no ptosis  [x]Face symmetric  [x]Facial sensation intact  [x]Tongue midline no atrophy or fasciculations present  [x]Palate midline, hearing to finger rub normal bilaterally  [x]Shoulder shrug and SCM testing normal bilaterally  COMMENTS:   Motor   [x]5/5 strength x 4

## 2020-06-08 ENCOUNTER — HOSPITAL ENCOUNTER (OUTPATIENT)
Dept: INFUSION THERAPY | Age: 55
Discharge: HOME | End: 2020-06-08
Payer: COMMERCIAL

## 2020-06-08 DIAGNOSIS — Z85.3 PERSONAL HISTORY OF BREAST CANCER: ICD-10-CM

## 2020-06-08 LAB
ALBUMIN SERPL-MCNC: 4.5 G/DL (ref 3.5–5.2)
ALP BLD-CCNC: 87 U/L (ref 35–104)
ALT SERPL-CCNC: 24 U/L (ref 9–52)
ANION GAP SERPL CALCULATED.3IONS-SCNC: 7 MMOL/L (ref 7–19)
AST SERPL-CCNC: 31 U/L (ref 14–36)
BASOPHILS ABSOLUTE: 0.04 K/UL (ref 0.01–0.08)
BASOPHILS RELATIVE PERCENT: 0.4 % (ref 0.1–1.2)
BILIRUB SERPL-MCNC: 1.1 MG/DL (ref 0.2–1.3)
BUN BLDV-MCNC: 13 MG/DL (ref 7–17)
CA 15-3: 25.5 U/ML (ref 0–35)
CALCIUM SERPL-MCNC: 9.4 MG/DL (ref 8.4–10.2)
CEA: 1.1 NG/ML (ref 0–3)
CHLORIDE BLD-SCNC: 103 MMOL/L (ref 98–111)
CO2: 31 MMOL/L (ref 22–29)
CREAT SERPL-MCNC: 0.5 MG/DL (ref 0.5–1)
EOSINOPHILS ABSOLUTE: 0.19 K/UL (ref 0.04–0.54)
EOSINOPHILS RELATIVE PERCENT: 1.9 % (ref 0.7–7)
GFR NON-AFRICAN AMERICAN: >60
GLOBULIN: 2.5 G/DL
GLUCOSE BLD-MCNC: 79 MG/DL (ref 74–106)
HCT VFR BLD CALC: 36.9 % (ref 34.1–44.9)
HEMOGLOBIN: 12.4 G/DL (ref 11.2–15.7)
LYMPHOCYTES ABSOLUTE: 3.41 K/UL (ref 1.18–3.74)
LYMPHOCYTES RELATIVE PERCENT: 34.4 % (ref 19.3–53.1)
MCH RBC QN AUTO: 29.7 PG (ref 25.6–32.2)
MCHC RBC AUTO-ENTMCNC: 33.6 G/DL (ref 32.3–35.5)
MCV RBC AUTO: 88.3 FL (ref 79.4–94.8)
MONOCYTES ABSOLUTE: 0.62 K/UL (ref 0.24–0.82)
MONOCYTES RELATIVE PERCENT: 6.3 % (ref 4.7–12.5)
NEUTROPHILS ABSOLUTE: 5.64 K/UL (ref 1.56–6.13)
NEUTROPHILS RELATIVE PERCENT: 57 % (ref 34–71.1)
PDW BLD-RTO: 12.7 % (ref 11.7–14.4)
PLATELET # BLD: 313 K/UL (ref 182–369)
PMV BLD AUTO: 8.7 FL (ref 7.4–10.4)
POTASSIUM SERPL-SCNC: 4.1 MMOL/L (ref 3.5–5.1)
RBC # BLD: 4.18 M/UL (ref 3.93–5.22)
SODIUM BLD-SCNC: 141 MMOL/L (ref 137–145)
TOTAL PROTEIN: 7 G/DL (ref 6.3–8.2)
WBC # BLD: 9.9 K/UL (ref 3.98–10.04)

## 2020-06-08 PROCEDURE — 85025 COMPLETE CBC W/AUTO DIFF WBC: CPT

## 2020-06-08 PROCEDURE — 86300 IMMUNOASSAY TUMOR CA 15-3: CPT

## 2020-06-08 PROCEDURE — 82378 CARCINOEMBRYONIC ANTIGEN: CPT

## 2020-06-08 PROCEDURE — 80053 COMPREHEN METABOLIC PANEL: CPT

## 2020-06-11 PROBLEM — R97.8 ELEVATED TUMOR MARKERS: Status: ACTIVE | Noted: 2020-06-11

## 2020-06-12 ENCOUNTER — HOSPITAL ENCOUNTER (OUTPATIENT)
Dept: INFUSION THERAPY | Age: 55
Discharge: HOME | End: 2020-06-12
Payer: COMMERCIAL

## 2020-06-12 ENCOUNTER — OFFICE VISIT (OUTPATIENT)
Dept: HEMATOLOGY | Age: 55
End: 2020-06-12
Payer: COMMERCIAL

## 2020-06-12 VITALS
WEIGHT: 135.5 LBS | SYSTOLIC BLOOD PRESSURE: 122 MMHG | DIASTOLIC BLOOD PRESSURE: 76 MMHG | BODY MASS INDEX: 25.58 KG/M2 | HEART RATE: 78 BPM | TEMPERATURE: 98.6 F | OXYGEN SATURATION: 97 % | HEIGHT: 61 IN

## 2020-06-12 DIAGNOSIS — Z85.3 PERSONAL HISTORY OF BREAST CANCER: ICD-10-CM

## 2020-06-12 LAB
BASOPHILS ABSOLUTE: 0.04 K/UL (ref 0.01–0.08)
BASOPHILS RELATIVE PERCENT: 0.6 % (ref 0.1–1.2)
EOSINOPHILS ABSOLUTE: 0.24 K/UL (ref 0.04–0.54)
EOSINOPHILS RELATIVE PERCENT: 3.5 % (ref 0.7–7)
HCT VFR BLD CALC: 41.5 % (ref 34.1–44.9)
HEMOGLOBIN: 13.2 G/DL (ref 11.2–15.7)
LYMPHOCYTES ABSOLUTE: 2.47 K/UL (ref 1.18–3.74)
LYMPHOCYTES RELATIVE PERCENT: 36.4 % (ref 19.3–53.1)
MCH RBC QN AUTO: 29.5 PG (ref 25.6–32.2)
MCHC RBC AUTO-ENTMCNC: 31.8 G/DL (ref 32.3–35.5)
MCV RBC AUTO: 92.8 FL (ref 79.4–94.8)
MONOCYTES ABSOLUTE: 0.57 K/UL (ref 0.24–0.82)
MONOCYTES RELATIVE PERCENT: 8.4 % (ref 4.7–12.5)
NEUTROPHILS ABSOLUTE: 3.47 K/UL (ref 1.56–6.13)
NEUTROPHILS RELATIVE PERCENT: 51.1 % (ref 34–71.1)
PDW BLD-RTO: 12.8 % (ref 11.7–14.4)
PLATELET # BLD: 281 K/UL (ref 182–369)
PMV BLD AUTO: 9.2 FL (ref 7.4–10.4)
RBC # BLD: 4.47 M/UL (ref 3.93–5.22)
WBC # BLD: 6.79 K/UL (ref 3.98–10.04)

## 2020-06-12 PROCEDURE — 85025 COMPLETE CBC W/AUTO DIFF WBC: CPT

## 2020-06-12 PROCEDURE — 99212 OFFICE O/P EST SF 10 MIN: CPT

## 2020-06-12 PROCEDURE — 99213 OFFICE O/P EST LOW 20 MIN: CPT | Performed by: NURSE PRACTITIONER

## 2020-06-12 ASSESSMENT — ENCOUNTER SYMPTOMS
DIARRHEA: 0
EYE DISCHARGE: 0
COUGH: 0
SHORTNESS OF BREATH: 0
EYE ITCHING: 0
TROUBLE SWALLOWING: 0
WHEEZING: 0
CONSTIPATION: 0
NAUSEA: 0
ABDOMINAL PAIN: 0
PHOTOPHOBIA: 0
EYE REDNESS: 0
VOMITING: 0
SORE THROAT: 0

## 2020-06-12 NOTE — PROGRESS NOTES
Progress Note      Pt Name: Elina Roe  YOB: 1965  MRN: [id number]    Date of evaluation: 6/12/2020  History Obtained From:  Patient, EMR    HISTORY OF PRESENT ILLNESS:    Rene Jackson is a 62year-old  female with a history of resected, stage IA triple negative, left breast cancer on 12/21/2000. Treatment included lumpectomy, followed by post-operative adjuvant AC ×4, XRT, and Taxotere ×4. Quoc Low has had no evidence of new or recurrent disease. She denies new breast nodules or lymphadenopathies. Gastroparesis is managed by Dr. Rianna Bolaños. She is followed by Dr. Amsita Berrios in [address] [place] [address] for \"pus\" in the urine. She has had cystoscopy and additional testing, which she states has been negative. Depression is stable. Quoc Low is accompanied by her  today. TARGET BREAST CANCER SITES:  1. Left lumpectomy 12/21/00. TUMOR HISTORY: Left stage I (T1 N0 M0) triple negative carcinosarcoma 12/21/00  On 12/21/00, Quoc Low underwent a left lumpectomy for a 1.9 cm infiltrating carcinosarcoma of the L breast where three sentinel lymph nodes were submitted and were found to be negative for breast cancer. The ER and MN were negative. The Her2 Roselyn was also negative. She went on to receive Adriamycin and Cytoxan plus Zenicard for four cycles followed by radiation therapy at 5,040 rads along with a 1,000 rad boost followed subsequently by four more cycles of Taxotere chemotherapy in the adjuvant setting. She has had no evidence of further recurrence of her disease thus far. TREATMENT SUMMARY:  1. Left lumpectomy 12/21/00.   2. AC x 4.   3. Radiation therapy. 4. Taxotere x 4.       Past Medical History:   Diagnosis Date    Breast cancer ([address] 2000    left    Breast mass, right 8/28/2012    Chronic sinusitis     Dry eye syndrome     Gallstones     GERD (gastroesophageal reflux disease)     Ovarian cyst      Past Surgical History:   Procedure Laterality Date    BREAST BIOPSY Relevant medical records and other physician notes have been reviewed. I answered all questions to the best of my knowledge and to the patient's satisfaction. Dictated utilizing Dragon transcription software.          ЕЛЕНА Jose  10:15 AM  6/12/2020

## 2020-11-16 ENCOUNTER — OFFICE VISIT (OUTPATIENT)
Dept: NEUROSURGERY | Age: 55
End: 2020-11-16
Payer: COMMERCIAL

## 2020-11-16 VITALS
WEIGHT: 135 LBS | BODY MASS INDEX: 25.49 KG/M2 | DIASTOLIC BLOOD PRESSURE: 62 MMHG | SYSTOLIC BLOOD PRESSURE: 122 MMHG | OXYGEN SATURATION: 98 % | HEIGHT: 61 IN | HEART RATE: 79 BPM

## 2020-11-16 PROCEDURE — 99214 OFFICE O/P EST MOD 30 MIN: CPT | Performed by: PSYCHIATRY & NEUROLOGY

## 2020-11-16 RX ORDER — ESCITALOPRAM OXALATE 10 MG/1
TABLET ORAL
Qty: 180 TABLET | Refills: 3 | Status: SHIPPED | OUTPATIENT
Start: 2020-11-16 | End: 2021-05-15

## 2020-11-16 NOTE — PROGRESS NOTES
[facility] Neurology Office Note      Patient:   Davey Macias  MR#:    [id number]  Account Number:                         YOB: 1965  Date of Evaluation:  11/16/2020  Time of Note:                          9:27 AM  Primary/Referring Physician:  Huma Yuan MD  Consulting Physician:  Marcel Ellis DO    FOLLOW UP VISIT    Chief Complaint   Patient presents with    Headache     6 Month follow up    [address] Marcel Macias is a 60y.o. year old female here for headaches and neck pain. MRI's completed, Brain, C-spine, MRA largely negative. Patient is now off neurontin. Pain is about the same off of the neurontin. Lexapro has helped with the anxiety but still noting some anxiety. Patient still describes atypical neuralgia, burning paresthesias over her scalp, will last a few seconds, and occur multiple times a day. Noting neck pain, sharp pain there at times as well. No overt photophobia, nausea, or positional component. No worsening with valsalva. Not triggered by touch, no clear triggers identified. Had a CD performed in [place] previously, still no records. Breast cancer history noted. No scalp tenderness or jaw claudication. No other complaints today.       Past Medical History:   Diagnosis Date    Breast cancer ([facility] [address] 2000    left    Breast mass, right 8/28/2012    Chronic sinusitis     Dry eye syndrome     Gallstones     GERD (gastroesophageal reflux disease)     Ovarian cyst        Past Surgical History:   Procedure Laterality Date    BREAST BIOPSY  3/18/13    right needle biopsy -    RIGHT BREAST 7 O'CLOCK POSITION, NO CALCS:    BREAST SURGERY  2000    left partial mastectomy with SND- carcino sarcoma - Dr Cirilo Cowart, Devante Giraldo  5/6/13   Cally Chung       Family History   Problem Relation Age of Onset    Cancer Maternal Aunt         breast    fatty acids 1000 MG capsule Take 2 g by mouth daily.  Evening Primrose OIL by Does not apply route.  Cholecalciferol 400 UNIT TABS tablet Take 400 Units by mouth daily. No current facility-administered medications for this visit. Allergies   Allergen Reactions    Codeine      hallucinations        REVIEW OF SYSTEMS    Constitutional: []Fever []Sweat []Chills [] Recent Injury [x] Denies all unless marked  HEENT:[x]Headache  [] Head Injury/Hearing Loss  [] Sore Throat  [] Ear Ache/Dizziness  [x] Denies all unless marked  Spine:  [] Neck pain  [] Back pain  [] Sciaticia  [x] Denies all unless marked  Cardiovascular:[]Heart Disease []Chest Pain [] Palpitations  [x] Denies all unless marked  Pulmonary: []Shortness of Breath []Cough   [x] Denies all unless marke  Gastrointestinal: []Nausea  []Vomiting  []Abdominal Pain  []Constipation  []Diarrhea  []Dark Bloody Stools  [x] Denies all unless marked  Psychiatric/Behavioral:[] Depression [x] Anxiety [x] Denies all unless marked  Genitourinary:   [x] Frequency  [x] Urgency  [] Incontinence [] Pain with Urination  [x] Denies all unless marked  Extremities: []Pain  []Swelling  [x] Denies all unless marked  Musculoskeletal: [] Muscle Pain  [] Joint Pain  [] Arthritis [] Muscle Cramps [x] Muscle Twitches  [x] Denies all unless marked  Sleep: [x] Insomnia [] Snoring [] Restless Legs [] Sleep Apnea  [x] Daytime Sleepiness  [x] Denies all unless marked  Skin:[] Rash [] Skin Discoloration [x] Denies all unless marked   Neurological: []Visual Disturbance/Memory Loss [] Loss of Balance [] Slurred Speech/Weakness [] Seizures  [] Vertigo/Dizziness [x] Denies all unless marked    I have reviewed the above ROS with the patient and agree with the ROS as documented above.          PHYSICAL EXAM    Constitutional -   /62   Pulse 79   Ht 5' 1\" (1.549 m)   Wt 135 lb (61.2 kg)   LMP 09/18/2019   SpO2 98%   BMI 25.51 kg/m²   General appearance: No acute distress   EYES -   Conjunctiva normal  Pupillary exam as below, see CN exam in the neurologic exam  ENT-    No scars, masses, or lesions over external nose or ears  Hearing normal bilaterally to finger rub  Cardiovascular -   No clubbing, cyanosis, or edema   Pulmonary-   Good expansion, normal effort without use of accessory muscles  Musculoskeletal -   No significant wasting of muscles noted  Gait as below, see gait exam in the neurologic exam  Muscle strength, tone, stability as below. No bony deformities  Skin -   Warm, dry, and intact to inspection and palpation. No rash, erythema, or pallor  Psychiatric -   Mood, affect, and behavior appear normal    Memory as below see mental status examination in the neurologic exam    NEUROLOGICAL EXAM    Mental status   [x]Awake, alert, oriented   [x]Affect attention and concentration appear appropriate  [x]Recent and remote memory appears unremarkable  [x]Speech normal without dysarthria or aphasia, comprehension and repetition intact. COMMENTS:    Cranial Nerves [x]No VF deficit to confrontation  [x]PERRLA, EOMI, no nystagmus, conjugate eye movements, no ptosis  [x]Face symmetric  [x]Facial sensation intact  [x]Tongue midline no atrophy or fasciculations present  [x]Palate midline, hearing to finger rub normal bilaterally  [x]Shoulder shrug and SCM testing normal bilaterally  COMMENTS:   Motor   [x]5/5 strength x 4 extremities  [x]Normal bulk and tone  [x]No tremor present  [x]No rigidity or bradykinesia noted  COMMENTS:   Sensory  [x]Sensation intact to light touch, pin prick, vibration, and proprioception BLE  []Sensation intact to light touch, pin prick, vibration, and proprioception BUE  COMMENTS:   Coordination [x]FTN normal bilaterally   []HTS normal bilaterally  []LINDA normal bilaterally.    COMMENTS:   Reflexes  [x]Symmetric and non-pathological  [x]Toes down going bilaterally  [x]No clonus present  COMMENTS:   Gait                  [x]Normal steady gait []Ataxic    []Spastic     []Magnetic     []Shuffling  COMMENTS:       LABS RECORD AND IMAGING REVIEW (As below and per HPI)      Lab Results   Component Value Date    WBC 6.79 06/12/2020    HGB 13.2 06/12/2020    HCT 41.5 06/12/2020    MCV 92.8 06/12/2020     06/12/2020     Lab Results   Component Value Date     06/08/2020    K 4.1 06/08/2020     06/08/2020    CO2 31 (H) 06/08/2020    BUN 13 06/08/2020    CREATININE 0.5 06/08/2020    GLUCOSE 79 06/08/2020    CALCIUM 9.4 06/08/2020    PROT 7.0 06/08/2020    LABALBU 4.5 06/08/2020    BILITOT 1.1 06/08/2020    ALKPHOS 87 06/08/2020    AST 31 06/08/2020    ALT 24 06/08/2020    LABGLOM >60 06/08/2020    GLOB 2.5 06/08/2020     MRI/MRA largely normal. Reviewed. MRI C-spine largely negative as well, Reviewed. Labs reviewed as well, negative. ASSESSMENT:    Jerilyn Mojica is a 60y.o. year old female here for neck pain and atypical neuralgia. MRI/MRA brain largely negative. MRI C-spine with mild ddd. Symptoms are not overtly c/w TN. Breast cancer history noted but no evidence of brain metastasis. Anxiety has responded to lexapro but still noted, no SI/HI. Neurontin had been  helping with pain, patient is now off. PLAN:  1. Re-request prior CD results imaging results again today. 2.  Off Neurontin, will restart 300 mg tid if worsens. 3.  Increase lexapro to 20 mg daily for anxiety.      Mykel Maldonado DO  Board Certified Neurology

## 2020-12-04 ENCOUNTER — HOSPITAL ENCOUNTER (OUTPATIENT)
Dept: INFUSION THERAPY | Age: 55
Discharge: HOME | End: 2020-12-04
Payer: COMMERCIAL

## 2020-12-04 DIAGNOSIS — Z85.3 PERSONAL HISTORY OF BREAST CANCER: ICD-10-CM

## 2020-12-04 LAB
ALBUMIN SERPL-MCNC: 4.4 G/DL (ref 3.5–5.2)
ALP BLD-CCNC: 85 U/L (ref 35–104)
ALT SERPL-CCNC: 22 U/L (ref 9–52)
ANION GAP SERPL CALCULATED.3IONS-SCNC: 14 MMOL/L (ref 7–19)
AST SERPL-CCNC: 28 U/L (ref 14–36)
BILIRUB SERPL-MCNC: 0.8 MG/DL (ref 0.2–1.3)
BUN BLDV-MCNC: 20 MG/DL (ref 7–17)
CA 15-3: 27.7 U/ML (ref 0–35)
CALCIUM SERPL-MCNC: 9.9 MG/DL (ref 8.4–10.2)
CEA: 1.1 NG/ML (ref 0–3)
CHLORIDE BLD-SCNC: 101 MMOL/L (ref 98–111)
CO2: 27 MMOL/L (ref 22–29)
CREAT SERPL-MCNC: 0.6 MG/DL (ref 0.5–1)
GFR NON-AFRICAN AMERICAN: >60
GLOBULIN: 3 G/DL
GLUCOSE BLD-MCNC: 95 MG/DL (ref 74–106)
POTASSIUM SERPL-SCNC: 4.6 MMOL/L (ref 3.5–5.1)
SODIUM BLD-SCNC: 142 MMOL/L (ref 137–145)
TOTAL PROTEIN: 7.4 G/DL (ref 6.3–8.2)

## 2020-12-04 PROCEDURE — 86300 IMMUNOASSAY TUMOR CA 15-3: CPT

## 2020-12-04 PROCEDURE — 80053 COMPREHEN METABOLIC PANEL: CPT

## 2020-12-04 PROCEDURE — 82378 CARCINOEMBRYONIC ANTIGEN: CPT

## 2020-12-11 ENCOUNTER — HOSPITAL ENCOUNTER (OUTPATIENT)
Dept: INFUSION THERAPY | Age: 55
Discharge: HOME | End: 2020-12-11
Payer: COMMERCIAL

## 2020-12-11 ENCOUNTER — TELEPHONE (OUTPATIENT)
Dept: HEMATOLOGY | Age: 55
End: 2020-12-11

## 2020-12-11 ENCOUNTER — OFFICE VISIT (OUTPATIENT)
Dept: HEMATOLOGY | Age: 55
End: 2020-12-11
Payer: COMMERCIAL

## 2020-12-11 VITALS
HEART RATE: 88 BPM | OXYGEN SATURATION: 96 % | SYSTOLIC BLOOD PRESSURE: 118 MMHG | TEMPERATURE: 97.3 F | BODY MASS INDEX: 25.98 KG/M2 | WEIGHT: 137.6 LBS | HEIGHT: 61 IN | DIASTOLIC BLOOD PRESSURE: 66 MMHG

## 2020-12-11 DIAGNOSIS — Z85.3 PERSONAL HISTORY OF BREAST CANCER: ICD-10-CM

## 2020-12-11 LAB
BASOPHILS ABSOLUTE: 0.06 K/UL (ref 0.01–0.08)
BASOPHILS RELATIVE PERCENT: 0.8 % (ref 0.1–1.2)
EOSINOPHILS ABSOLUTE: 0.36 K/UL (ref 0.04–0.54)
EOSINOPHILS RELATIVE PERCENT: 4.7 % (ref 0.7–7)
HCT VFR BLD CALC: 39.1 % (ref 34.1–44.9)
HEMOGLOBIN: 12.7 G/DL (ref 11.2–15.7)
LYMPHOCYTES ABSOLUTE: 2.75 K/UL (ref 1.18–3.74)
LYMPHOCYTES RELATIVE PERCENT: 35.8 % (ref 19.3–53.1)
MCH RBC QN AUTO: 29.4 PG (ref 25.6–32.2)
MCHC RBC AUTO-ENTMCNC: 32.5 G/DL (ref 32.3–35.5)
MCV RBC AUTO: 90.5 FL (ref 79.4–94.8)
MONOCYTES ABSOLUTE: 0.56 K/UL (ref 0.24–0.82)
MONOCYTES RELATIVE PERCENT: 7.3 % (ref 4.7–12.5)
NEUTROPHILS ABSOLUTE: 3.96 K/UL (ref 1.56–6.13)
NEUTROPHILS RELATIVE PERCENT: 51.4 % (ref 34–71.1)
PDW BLD-RTO: 12.7 % (ref 11.7–14.4)
PLATELET # BLD: 270 K/UL (ref 182–369)
PMV BLD AUTO: 9.3 FL (ref 7.4–10.4)
RBC # BLD: 4.32 M/UL (ref 3.93–5.22)
WBC # BLD: 7.69 K/UL (ref 3.98–10.04)

## 2020-12-11 PROCEDURE — 99211 OFF/OP EST MAY X REQ PHY/QHP: CPT

## 2020-12-11 PROCEDURE — 99213 OFFICE O/P EST LOW 20 MIN: CPT | Performed by: NURSE PRACTITIONER

## 2020-12-11 PROCEDURE — 85025 COMPLETE CBC W/AUTO DIFF WBC: CPT

## 2020-12-11 RX ORDER — MIRABEGRON 50 MG/1
TABLET, FILM COATED, EXTENDED RELEASE ORAL
COMMUNITY
Start: 2020-11-20 | End: 2020-12-11

## 2020-12-11 RX ORDER — OXYBUTYNIN CHLORIDE 5 MG/1
TABLET, EXTENDED RELEASE ORAL
COMMUNITY
Start: 2020-12-10

## 2020-12-11 ASSESSMENT — ENCOUNTER SYMPTOMS
VOMITING: 0
EYE DISCHARGE: 0
NAUSEA: 0
TROUBLE SWALLOWING: 0
WHEEZING: 0
SORE THROAT: 0
CONSTIPATION: 0
SHORTNESS OF BREATH: 0
EYE ITCHING: 0
ABDOMINAL PAIN: 0
COUGH: 0

## 2020-12-11 NOTE — PROGRESS NOTES
Progress Note      Pt Name: Keagan Vázquez  YOB: 1965  MRN: [id number]    Date of evaluation: 12/11/2020  History Obtained From:  Patient, EMR    HISTORY OF PRESENT ILLNESS:    Sis Sykes is a 60year-old  female  returning to the clinic for follow-up surveillance of breast cancer. Mingo Coats has a history of resected, stage IA triple negative, left breast cancer 20 years ago, on 12/21/2000. Treatment included lumpectomy, followed by post-operative adjuvant AC ×4, XRT, and Taxotere ×4. Mingo Coats has had no evidence of new or recurrent disease. She denies new breast nodules or lymphadenopathies. Gastroparesis is managed by Dr. Ame Mercado. She is followed by Dr. Tyron Calderon in [address] [place] [address] for hematuria with cystoscopy, which she reports was negative. She has been having urinary retention and was trialed on Myrbetriq which caused excessive dry mouth. She is now to be trialed on Ditropan XL, which she will be starting today. Depression is stable. TARGET BREAST CANCER SITES:  1. Left lumpectomy 12/21/00. 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 The ER and DC were negative. The Her2 Roselyn was also negative. She went on to receive Adriamycin and Cytoxan plus Zenicard for four cycles followed by radiation therapy at 5,040 rads along with a 1,000 rad boost followed subsequently by four more cycles of Taxotere chemotherapy in the adjuvant setting. She has had no evidence of further recurrence of her disease thus far. TREATMENT SUMMARY:  1. Left lumpectomy 12/21/00.   2. AC x 4.   3. Radiation therapy. 4. Taxotere x 4.     Past Medical History:   Diagnosis Date    Breast cancer ([facility] [address] 2000    left    Breast mass, right 8/28/2012    Chronic sinusitis  Dry eye syndrome     Gallstones     GERD (gastroesophageal reflux disease)     Ovarian cyst      Past Surgical History:   Procedure Laterality Date    BREAST BIOPSY  3/18/13    right needle biopsy -    RIGHT BREAST 7 O'CLOCK POSITION, NO CALCS:    BREAST SURGERY  2000    left partial mastectomy with SND- carcino sarcoma - Dr Ostio Larios, 1997    CHOLECYSTECTOMY  5/6/13   Dustin Coto     Current Outpatient Medications   Medication Sig Dispense Refill    oxybutynin (DITROPAN-XL) 5 MG extended release tablet       escitalopram (LEXAPRO) 10 MG tablet Take 2 tabs by mouth once daily 180 tablet 3    gabapentin (NEURONTIN) 300 MG capsule TAKE 1 CAPSULE BY MOUTH THREE TIMES DAILY 90 capsule 5    NONFORMULARY Indications: DigestZen       therapeutic multivitamin-minerals (THERAGRAN-M) tablet Take 1 tablet by mouth daily.  fish oil-omega-3 fatty acids 1000 MG capsule Take 2 g by mouth daily.  Evening Primrose OIL by Does not apply route.  Cholecalciferol 400 UNIT TABS tablet Take 400 Units by mouth daily. No current facility-administered medications for this visit. Allergies   Allergen Reactions    Codeine      hallucinations     Social History     Tobacco Use    Smoking status: Never Smoker    Smokeless tobacco: Never Used   Substance Use Topics    Alcohol use: No    Drug use: No     Family History   Problem Relation Age of Onset    Cancer Maternal Aunt         breast    Diabetes Maternal Grandmother     Cancer Maternal Grandmother         lung    Diabetes Maternal Grandfather     Heart Disease Maternal Grandfather     Other Sister         cholecystitis    Cancer Half-Sister      Review of Systems   Constitutional: Negative for fatigue and fever. No night sweats   HENT: Negative for dental problem, hearing loss, mouth sores, nosebleeds, sore throat and trouble swallowing. Eyes: Negative for discharge and itching. Respiratory: Negative for cough, shortness of breath and wheezing. No hemoptysis   Cardiovascular: Negative for chest pain, palpitations and leg swelling. Gastrointestinal: Negative for abdominal pain, constipation, nausea and vomiting. H/o gastroparesis    Endocrine: Negative for cold intolerance and heat intolerance. Genitourinary: Positive for difficulty urinating (incomplete empyting ) and hematuria (history of, improved). Negative for dysuria, frequency and urgency. Musculoskeletal: Positive for arthralgias. Negative for joint swelling and myalgias. Skin: Negative for pallor and rash. Allergic/Immunologic: Negative for environmental allergies and immunocompromised state. Neurological: Negative for seizures, syncope and numbness. Hematological: Negative for adenopathy. Does not bruise/bleed easily. Psychiatric/Behavioral: Negative for agitation, behavioral problems and confusion. Depression, stable     Physical Exam  Vitals signs reviewed. Constitutional:       General: She is not in acute distress. Appearance: Normal appearance. She is well-developed. She is not toxic-appearing or diaphoretic. HENT:      Head: Normocephalic and atraumatic. Right Ear: External ear normal.      Left Ear: External ear normal.      Nose: Nose normal.      Mouth/Throat:      Mouth: Mucous membranes are moist.   Eyes:      General: No scleral icterus. Right eye: No discharge. Left eye: No discharge. Conjunctiva/sclera: Conjunctivae normal.   Neck:      Musculoskeletal: Neck supple. Trachea: No tracheal deviation. Cardiovascular:      Rate and Rhythm: Normal rate and regular rhythm. Heart sounds: No murmur. Pulmonary:      Effort: Pulmonary effort is normal. No respiratory distress. Breath sounds: Normal breath sounds. No wheezing or rales.    Chest:          Comments: Asymmetrical breasts (left smaller than right) due to postsurgical changes. Prior incision well healed. no palpable nodules, masses or lymphadenopathy. Alix Krill does not display lymphedema  Abdominal:      General: Bowel sounds are normal. There is no distension. Palpations: Abdomen is soft. Tenderness: There is no abdominal tenderness. There is no guarding. Genitourinary:     Comments: Exam deferred  Musculoskeletal:         General: No tenderness or deformity. Comments: Normal ROM all four extremities   Lymphadenopathy:      Cervical: No cervical adenopathy. Right cervical: No superficial cervical adenopathy. Left cervical: No superficial cervical adenopathy. Upper Body:      Right upper body: No supraclavicular or axillary adenopathy. Left upper body: No supraclavicular or axillary adenopathy. Comments: No bulky palpable cervical, clavicular, axillary or inguinal adenopathies on the left or right. Skin:     General: Skin is warm and dry. Findings: No rash. Neurological:      Mental Status: She is alert and oriented to person, place, and time. Comments: follows commands, non-focal   Psychiatric:         Behavior: Behavior normal. Behavior is cooperative. Thought Content: Thought content normal.         Judgment: Judgment normal.      Comments: Alert and oriented to person, place and time. Vitals:    12/11/20 0933   BP: 118/66   Pulse: 88   Temp: 97.3 °F (36.3 °C)   TempSrc: Temporal   SpO2: 96%   Weight: 137 lb 9.6 oz (62.4 kg)   Height: 5' 1\" (1.549 m)      Wt Readings from Last 3 Encounters:   12/11/20 137 lb 9.6 oz (62.4 kg)   11/16/20 135 lb (61.2 kg)   06/12/20 135 lb 8 oz (61.5 kg)     LABS:  CBC 12/11/2020: WBC 7.69, Hgb 12.7/MCV 90.5, platelets 062,799    LABS 12/4/2020:  · CA 15-3: 27.7  · CEA: 1.1  · CMP: Unremarkable    ASSESSMENT/PLAN:  1. Personal history of breast cancer    2. Encounter for follow-up surveillance of breast cancer    3.  Osteopenia of lumbar spine    4. Elevated CA 15-3 level    5. History of hematuria    6. BMI 26.0-26.9,adult         History of left, triple negative breast cancer status post lumpectomy 12/21/2000  Status post AC ×4, adjuvant XRT, followed by Taxotere ×4. Bilateral mammograms 12/9/2020 at [place] were BI-RADS Category 2  No evidence of disease on exam    History of elevated CA 15-3  CA 15-3 fluctuates and was 28.4 on 12/6/2019  CA 15-3 was 25.5 on 6/8/2020  CA 15-3 was 27.7 on 12/4/2020    Osteopenia  BMD 5/9/2020 at [place] showed osteopenia  Continue calcium with vitamin D    History of hematuria  followed by Dr. Tyron Calderon in [place]    Body mass index is 26 kg/m². BMI is stable. Federal guidelines recommend that people under the age of 72 should have a BMI of 18.5-25 and people age 72 and older should have a BMI of 23-30. If yours is outside the range, we recommend you utilize a diet and exercise program to get yours into the range. We also recommend you speak with your primary care doctor should any specific advice be needed regarding special diets or programs which would be appropriate for your circumstances. Primary care needs per Dr. Manisha Graham. I have seen, examined and reviewed patient medication list, appropriate labs and imaging studies. Relevant medical records and other physician notes have been reviewed. I answered all questions to the best of my knowledge and to the patient's satisfaction. Dictated utilizing Dragon transcription software. Orders Placed This Encounter   Procedures    Comprehensive Metabolic Panel    Cancer Antigen 15-3    CEA    CBC Auto Differential       Return in about 6 months (around 6/11/2021) for follow up with ЕЛЕНА Cummings (NO CBC DAY OF APPT). I have seen, examined and reviewed patient medication list, appropriate labs and imaging studies. Relevant medical records and other physician notes have been reviewed.     I answered all questions to the best of my knowledge and to the patient's satisfaction. Dictated utilizing Dragon transcription software.          ЕЛЕНА Talley  10:11 AM  12/11/2020

## 2021-06-08 DIAGNOSIS — Z85.3 PERSONAL HISTORY OF BREAST CANCER: Primary | ICD-10-CM

## 2021-06-10 ENCOUNTER — HOSPITAL ENCOUNTER (OUTPATIENT)
Dept: INFUSION THERAPY | Age: 56
Discharge: HOME | End: 2021-06-10
Payer: COMMERCIAL

## 2021-06-10 DIAGNOSIS — Z85.3 PERSONAL HISTORY OF BREAST CANCER: ICD-10-CM

## 2021-06-10 DIAGNOSIS — M85.88 OSTEOPENIA OF LUMBAR SPINE: ICD-10-CM

## 2021-06-10 LAB
ALBUMIN SERPL-MCNC: 4.6 G/DL (ref 3.5–5.2)
ALP BLD-CCNC: 81 U/L (ref 35–104)
ALT SERPL-CCNC: 24 U/L (ref 9–52)
ANION GAP SERPL CALCULATED.3IONS-SCNC: 11 MMOL/L (ref 7–19)
AST SERPL-CCNC: 28 U/L (ref 14–36)
BASOPHILS ABSOLUTE: 0.06 K/UL (ref 0.01–0.08)
BASOPHILS RELATIVE PERCENT: 0.8 % (ref 0.1–1.2)
BILIRUB SERPL-MCNC: 0.8 MG/DL (ref 0.2–1.3)
BUN BLDV-MCNC: 13 MG/DL (ref 7–17)
CA 15-3: 23.9 U/ML (ref 0–35)
CALCIUM SERPL-MCNC: 9.7 MG/DL (ref 8.4–10.2)
CEA: 0.9 NG/ML (ref 0–3)
CHLORIDE BLD-SCNC: 103 MMOL/L (ref 98–111)
CO2: 30 MMOL/L (ref 22–29)
CREAT SERPL-MCNC: 0.7 MG/DL (ref 0.5–1)
EOSINOPHILS ABSOLUTE: 0.22 K/UL (ref 0.04–0.54)
EOSINOPHILS RELATIVE PERCENT: 2.9 % (ref 0.7–7)
GFR NON-AFRICAN AMERICAN: >60
GLOBULIN: 2.8 G/DL
GLUCOSE BLD-MCNC: 107 MG/DL (ref 74–106)
HCT VFR BLD CALC: 38.7 % (ref 34.1–44.9)
HEMOGLOBIN: 12.8 G/DL (ref 11.2–15.7)
LYMPHOCYTES ABSOLUTE: 2.77 K/UL (ref 1.18–3.74)
LYMPHOCYTES RELATIVE PERCENT: 36.6 % (ref 19.3–53.1)
MCH RBC QN AUTO: 28.9 PG (ref 25.6–32.2)
MCHC RBC AUTO-ENTMCNC: 33.1 G/DL (ref 32.3–35.5)
MCV RBC AUTO: 87.4 FL (ref 79.4–94.8)
MONOCYTES ABSOLUTE: 0.68 K/UL (ref 0.24–0.82)
MONOCYTES RELATIVE PERCENT: 9 % (ref 4.7–12.5)
NEUTROPHILS ABSOLUTE: 3.84 K/UL (ref 1.56–6.13)
NEUTROPHILS RELATIVE PERCENT: 50.7 % (ref 34–71.1)
PDW BLD-RTO: 12.4 % (ref 11.7–14.4)
PLATELET # BLD: 319 K/UL (ref 182–369)
PMV BLD AUTO: 8.7 FL (ref 7.4–10.4)
POTASSIUM SERPL-SCNC: 4.3 MMOL/L (ref 3.5–5.1)
RBC # BLD: 4.43 M/UL (ref 3.93–5.22)
SODIUM BLD-SCNC: 144 MMOL/L (ref 137–145)
TOTAL PROTEIN: 7.4 G/DL (ref 6.3–8.2)
WBC # BLD: 7.57 K/UL (ref 3.98–10.04)

## 2021-06-10 PROCEDURE — 36415 COLL VENOUS BLD VENIPUNCTURE: CPT

## 2021-06-10 PROCEDURE — 86300 IMMUNOASSAY TUMOR CA 15-3: CPT

## 2021-06-10 PROCEDURE — 80053 COMPREHEN METABOLIC PANEL: CPT

## 2021-06-10 PROCEDURE — 82378 CARCINOEMBRYONIC ANTIGEN: CPT

## 2021-06-10 PROCEDURE — 85025 COMPLETE CBC W/AUTO DIFF WBC: CPT

## 2021-06-18 ENCOUNTER — APPOINTMENT (OUTPATIENT)
Dept: INFUSION THERAPY | Age: 56
End: 2021-06-18
Payer: COMMERCIAL

## 2021-06-23 ENCOUNTER — OFFICE VISIT (OUTPATIENT)
Dept: NEUROSURGERY | Age: 56
End: 2021-06-23
Payer: COMMERCIAL

## 2021-06-23 VITALS
HEIGHT: 61 IN | DIASTOLIC BLOOD PRESSURE: 66 MMHG | HEART RATE: 68 BPM | SYSTOLIC BLOOD PRESSURE: 112 MMHG | BODY MASS INDEX: 25.86 KG/M2 | OXYGEN SATURATION: 98 % | WEIGHT: 137 LBS

## 2021-06-23 DIAGNOSIS — M54.2 NECK PAIN: ICD-10-CM

## 2021-06-23 DIAGNOSIS — F41.9 ANXIETY: ICD-10-CM

## 2021-06-23 DIAGNOSIS — R51.9 HEADACHE, UNSPECIFIED HEADACHE TYPE: Primary | ICD-10-CM

## 2021-06-23 PROCEDURE — 99214 OFFICE O/P EST MOD 30 MIN: CPT | Performed by: PSYCHIATRY & NEUROLOGY

## 2021-06-23 NOTE — PROGRESS NOTES
Progress Note      Pt Name: David Luz  YOB: 1965  MRN: [id number]    Date of evaluation: 6/24/2021  History Obtained From:  Patient, EMR    Chief Complaint   Patient presents with    Follow-up     History of breast cancer     HISTORY OF PRESENT ILLNESS:    Marybeth Estrada is a 73-year-old female with a history of breast cancer dating to December, 2000. She has a lumpectomy for stage IA triple negative left breast cancer on 12/21/2000. Moriah Nuñez received adjuvant, postoperative chemotherapy and radiation. She has been monitored conservatively without evidence of new or recurrent disease. She returns today in scheduled follow-up for breast cancer surveillance. Mammograms 12/9/2020 at [facility] were BI-RADS Category 2. Moriah Nuñez has a history of cystoscopy by Dr. Lida Danielson in [address] [place] [address] for hematuria, also treated for urinary retention. She reports negative exam and denies further hematuria. Gastroparesis is managed by Dr. Amy Mims. GERD is stable. Moriah Nuñez is scheduled to see ЕЛЕНА Hernandez in July, 2021 for routine GYN evaluation. TARGET BREAST CANCER SITES:  1. Left lumpectomy 12/21/00. 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 The ER and NM were negative. The Her2 Roselyn was also negative. She went on to receive Adriamycin and Cytoxan plus Zenicard for four cycles followed by radiation therapy at 5,040 rads along with a 1,000 rad boost followed subsequently by four more cycles of Taxotere chemotherapy in the adjuvant setting. She has had no evidence of further recurrence of her disease thus far. TREATMENT SUMMARY:  1. Left lumpectomy 12/21/00.   2. AC x 4.   3. Radiation therapy. 4. Taxotere x 4.     Past Medical History:   Diagnosis Date    Breast cancer ([facility] [address] 2000    left    Breast mass, right 8/28/2012    Chronic sinusitis     Dry eye syndrome     Gallstones     GERD (gastroesophageal reflux disease)     Ovarian cyst      Past Surgical History:   Procedure Laterality Date    BREAST BIOPSY  3/18/13    right needle biopsy -    RIGHT BREAST 7 O'CLOCK POSITION, NO CALCS:    BREAST SURGERY  2000    left partial mastectomy with SND- carcino sarcoma - Dr Ervin Zavala, 1997    CHOLECYSTECTOMY  5/6/13   Edward De La Vega     Current Outpatient Medications   Medication Sig Dispense Refill    Cholecalciferol (VITAMIN D3) 50 MCG (2000 UT) CAPS Take 1 capsule by mouth daily      escitalopram (LEXAPRO) 10 MG tablet Take 1 tablet by mouth once daily 90 tablet 3    oxybutynin (DITROPAN-XL) 5 MG extended release tablet       NONFORMULARY Indications: DigestZen       therapeutic multivitamin-minerals (THERAGRAN-M) tablet Take 1 tablet by mouth daily.  fish oil-omega-3 fatty acids 1000 MG capsule Take 2 g by mouth daily. No current facility-administered medications for this visit. Allergies   Allergen Reactions    Codeine      hallucinations     Social History     Tobacco Use    Smoking status: Never Smoker    Smokeless tobacco: Never Used   Vaping Use    Vaping Use: Never used   Substance Use Topics    Alcohol use: No    Drug use: No     Family History   Problem Relation Age of Onset    Cancer Maternal Aunt         breast    Diabetes Maternal Grandmother     Cancer Maternal Grandmother         lung    Diabetes Maternal Grandfather     Heart Disease Maternal Grandfather     Other Sister         cholecystitis    Cancer Half-Sister      Review of Systems   Constitutional: Negative for fatigue and fever. HENT: Negative for dental problem, hearing loss, mouth sores, nosebleeds, sore throat and trouble swallowing.     Eyes: Negative for discharge and itching. Respiratory: Negative for cough, shortness of breath and wheezing. Cardiovascular: Negative for chest pain, palpitations and leg swelling. Gastrointestinal: Negative for abdominal pain, constipation, nausea and vomiting. H/o gastroparesis    Endocrine: Negative for cold intolerance and heat intolerance. Genitourinary: Negative for dysuria, frequency and urgency. History of hematuria, urinary retention, both improved   Musculoskeletal: Positive for arthralgias. Negative for joint swelling and myalgias. Skin: Negative for pallor and rash. Allergic/Immunologic: Negative for environmental allergies and immunocompromised state. Neurological: Negative for seizures, syncope and numbness. Hematological: Negative for adenopathy. Does not bruise/bleed easily. Psychiatric/Behavioral: Negative for agitation, behavioral problems and confusion. Depression, stable     Physical Exam  Vitals reviewed. Constitutional:       General: She is not in acute distress. Appearance: Normal appearance. She is well-developed. She is not toxic-appearing or diaphoretic. HENT:      Head: Normocephalic and atraumatic. Right Ear: External ear normal.      Left Ear: External ear normal.      Nose: Nose normal.      Mouth/Throat:      Mouth: Mucous membranes are moist.   Eyes:      General: No scleral icterus. Right eye: No discharge. Left eye: No discharge. Conjunctiva/sclera: Conjunctivae normal.   Neck:      Trachea: No tracheal deviation. Cardiovascular:      Rate and Rhythm: Normal rate and regular rhythm. Heart sounds: No murmur heard. Pulmonary:      Effort: Pulmonary effort is normal. No respiratory distress. Breath sounds: Normal breath sounds. No wheezing or rales. Chest:          Comments: No new masses, nodules or lymphadenopathies on the left or right. The left lumpectomy site is well-healed.   Chronic asymmetrical breasts noted due to postsurgical changes on the left  Abdominal:      General: Bowel sounds are normal. There is no distension. Palpations: Abdomen is soft. Tenderness: There is no abdominal tenderness. There is no guarding. Genitourinary:     Comments: Exam deferred  Musculoskeletal:         General: No tenderness or deformity. Cervical back: Neck supple. Comments: Normal ROM all four extremities   Lymphadenopathy:      Cervical: No cervical adenopathy. Right cervical: No superficial cervical adenopathy. Left cervical: No superficial cervical adenopathy. Upper Body:      Right upper body: No supraclavicular or axillary adenopathy. Left upper body: No supraclavicular or axillary adenopathy. Comments:      Skin:     General: Skin is warm and dry. Findings: No rash. Neurological:      Mental Status: She is alert and oriented to person, place, and time. Comments: follows commands, non-focal   Psychiatric:         Behavior: Behavior normal. Behavior is cooperative. Thought Content: Thought content normal.         Judgment: Judgment normal.      Comments: Alert and oriented to person, place and time. Vitals:    06/24/21 1130   BP: 124/72   Pulse: 72   SpO2: 95%   Weight: 141 lb 3.2 oz (64 kg)   Height: 5' 1\" (1.549 m)      Wt Readings from Last 3 Encounters:   06/24/21 141 lb 3.2 oz (64 kg)   06/23/21 137 lb (62.1 kg)   12/11/20 137 lb 9.6 oz (62.4 kg)     LABS:  CBC 6/24/2021:   Lab Results   Component Value Date    WBC 7.29 06/24/2021    HGB 12.4 06/24/2021    HCT 39.4 06/24/2021    MCV 91.6 06/24/2021     06/24/2021    LABLYMP 3.24 04/23/2013    LYMPHOPCT 34.0 06/24/2021    RBC 4.30 06/24/2021    MCH 28.8 06/24/2021    MCHC 31.5 (L) 06/24/2021    RDW 12.4 06/24/2021     Labs 6/10/2021:  · CBC: WBC 7.57, Hgb 12.8/MCV 87.4, platelets [phone],813  · CMP: Essentially unremarkable  · CA 15-3: 23.9  · CEA: 0.9    ASSESSMENT/PLAN:  1.  Personal history of breast cancer 2. Encounter for follow-up surveillance of breast cancer    3. Elevated CA 15-3 level    4. Encounter for screening mammogram for breast cancer    5. Osteopenia of lumbar spine    6. BMI 26.0-26.9,adult         History of left, triple negative breast cancer status post lumpectomy 12/21/2000  Status post adjuvant chemotherapy and radiation  Bilateral mammograms 12/9/2020 at [facility] were BI-RADS Category 2 and will be ordered when due 12/2021  No evidence of disease by exam    History of elevated CA 15-3, resolved  CA 15-3 fluctuates and was 28.4 on 12/6/2019  CA 15-3 was 25.5 on 6/8/2020  CA 15-3 was 27.7 on 12/4/2020  CA 15-3 was 23.9 on 6/10/2021  Osteopenia  BMD 5/9/2020 at [facility] revealed osteopenia  Tru Has will continue calcium with vitamin D supplementation    Body mass index is 26.68 kg/m². BMI is stable. Federal guidelines recommend that people under the age of 72 should have a BMI of 18.5-25 and people age 72 and older should have a BMI of 23-30. If yours is outside the range, we recommend you utilize a diet and exercise program to get yours into the range. We also recommend you speak with your primary care doctor should any specific advice be needed regarding special diets or programs which would be appropriate for your circumstances. Plans to establish care with Dr. Teto Butler. Orders Placed This Encounter   Procedures    KEYUR DIGITAL SCREEN W OR WO CAD BILATERAL    CBC Auto Differential    Comprehensive Metabolic Panel    Cancer Antigen 15-3     Return in about 6 months (around 12/24/2021) for follow up with ЕЛЕНА Butt (after mammograms, NO CBC DAY OF APT). IYoli am scribing for ЕЛЕНА Donato. Electronically signed by Yoli Wilson on 6/24/2021 at 11:18 AM    IDelicia APRN, personally performed the services described in this documentation as scribed by Lauren Saenz RN in my presence and it is both accurate and complete.     I have seen, examined and reviewed this patient medication list, appropriate labs and imaging studies. I reviewed relevant medical records and others physicians notes. I discussed the plan of care with the patient. I answered all questions to the patients satisfaction. I have also reviewed the chief complaint (CC) and part of the history (History of Present Illness (HPI), Past Family Social History [facility]), or Review of Systems (ROS) and made changes when appropriated. Dictated utilizing Dragon transcription software.         ЕЛЕНА Vasquez  11:18 AM  6/27/2021

## 2021-06-23 NOTE — PROGRESS NOTES
[address] [facility] Neurology Office Note      Patient:   Alissa Kelly  MR#:    [id number]  Account Number:                         YOB: 1965  Date of Evaluation:  6/23/2021  Time of Note:                          11:06 AM  Primary/Referring Physician:  Pretty Taylor MD  Consulting Physician:  Osiris Callaway,     FOLLOW UP VISIT    Chief Complaint   Patient presents with    Headache    Anxiety       HISTORY OF PRESENT ILLNESS    Alissa Kelly is a 60y.o. year old female here for headaches and neck pain. Prior MRI's, Brain, C-spine, MRA largely negative. Remains off neurontin. Pain is doing about the same. Lexapro has helped with the anxiety but still noting some anxiety, no SI/HI. Patient still describes atypical neuralgia, burning paresthesias over her scalp, will last a few seconds, and occur multiple times a day. Noting neck pain, sharp pain there at times as well. No overt photophobia, nausea, or positional component. No worsening with valsalva. Not triggered by touch, no clear triggers identified. Had a CD performed in [place] previously, still no records. Breast cancer history noted. No scalp tenderness or jaw claudication. No other complaints today.       Past Medical History:   Diagnosis Date    Breast cancer ([address] 2000    left    Breast mass, right 8/28/2012    Chronic sinusitis     Dry eye syndrome     Gallstones     GERD (gastroesophageal reflux disease)     Ovarian cyst        Past Surgical History:   Procedure Laterality Date    BREAST BIOPSY  3/18/13    right needle biopsy -    RIGHT BREAST 7 O'CLOCK POSITION, NO CALCS:    BREAST SURGERY  2000    left partial mastectomy with SND- carcino sarcoma - Dr Greg Bosworth, [facility] Kim  5/6/13   Amaris Zuniga       Family History   Problem Relation Age of Onset    Cancer Maternal Aunt         breast    Diabetes Maternal Grandmother     Cancer Maternal Grandmother         lung    Diabetes Maternal Grandfather     Heart Disease Maternal Grandfather     Other Sister         cholecystitis    Cancer Half-Sister        Social History     Socioeconomic History    Marital status:      Spouse name: Not on file    Number of children: Not on file    Years of education: Not on file    Highest education level: Not on file   Occupational History    Not on file   Tobacco Use    Smoking status: Never Smoker    Smokeless tobacco: Never Used   Vaping Use    Vaping Use: Never used   Substance and Sexual Activity    Alcohol use: No    Drug use: No    Sexual activity: Yes     Partners: Male   Other Topics Concern    Not on file   Social History Narrative    Not on file     Social Determinants of Health     Financial Resource Strain:     Difficulty of Paying Living Expenses:    Food Insecurity:     Worried About Running Out of Food in the Last Year:     Ran Out of Food in the Last Year:    Transportation Needs:     Lack of Transportation (Medical):      Lack of Transportation (Non-Medical):    Physical Activity:     Days of Exercise per Week:     Minutes of Exercise per Session:    Stress:     Feeling of Stress :    Social Connections:     Frequency of Communication with Friends and Family:     Frequency of Social Gatherings with Friends and Family:     Attends Anabaptism Services:     Active Member of Clubs or Organizations:     Attends Club or Organization Meetings:     Marital Status:    Intimate Partner Violence:     Fear of Current or Ex-Partner:     Emotionally Abused:     Physically Abused:     Sexually Abused:        Current Outpatient Medications   Medication Sig Dispense Refill    escitalopram (LEXAPRO) 10 MG tablet Take 1 tablet by mouth once daily 90 tablet 3    oxybutynin (DITROPAN-XL) 5 MG extended release tablet       gabapentin (NEURONTIN) 300 MG capsule TAKE 1 CAPSULE BY MOUTH THREE TIMES DAILY 90 capsule 5 /66   Pulse 68   Ht 5' 1\" (1.549 m)   Wt 137 lb (62.1 kg)   LMP 09/18/2019   SpO2 98%   BMI 25.89 kg/m²   General appearance: No acute distress   EYES -   Conjunctiva normal  Pupillary exam as below, see CN exam in the neurologic exam  ENT-    No scars, masses, or lesions over external nose or ears  Hearing normal bilaterally to finger rub  Cardiovascular -   No clubbing, cyanosis, or edema   Pulmonary-   Good expansion, normal effort without use of accessory muscles  Musculoskeletal    No significant wasting of muscles noted  Gait as below, see gait exam in the neurologic exam  Muscle strength, tone, stability as below. No bony deformities  Skin    Warm, dry, and intact to inspection and palpation. No rash, erythema, or pallor  Psychiatric    Mood, affect, and behavior appear normal    Memory as below see mental status examination in the neurologic exam    NEUROLOGICAL EXAM    Mental status   [x]Awake, alert, oriented   [x]Affect attention and concentration appear appropriate  [x]Recent and remote memory appears unremarkable  [x]Speech normal without dysarthria or aphasia, comprehension and repetition intact. COMMENTS:    Cranial Nerves [x]No VF deficit to confrontation  [x]PERRLA, EOMI, no nystagmus, conjugate eye movements, no ptosis  [x]Face symmetric  [x]Facial sensation intact  [x]Tongue midline no atrophy or fasciculations present  [x]Palate midline, hearing to finger rub normal bilaterally  [x]Shoulder shrug and SCM testing normal bilaterally  COMMENTS:   Motor   [x]5/5 strength x 4 extremities  [x]Normal bulk and tone  [x]No tremor present  [x]No rigidity or bradykinesia noted  COMMENTS:   Sensory  [x]Sensation intact to light touch, pin prick, vibration, and proprioception BLE  []Sensation intact to light touch, pin prick, vibration, and proprioception BUE  COMMENTS:   Coordination [x]FTN normal bilaterally   []HTS normal bilaterally  []LINDA normal bilaterally.    COMMENTS:   Reflexes [x]Symmetric and non-pathological  [x]Toes down going bilaterally  [x]No clonus present  COMMENTS:   Gait                  [x]Normal steady gait    []Ataxic    []Spastic     []Magnetic     []Shuffling  COMMENTS:       LABS RECORD AND IMAGING REVIEW (As below and per HPI)      Lab Results   Component Value Date    WBC 7.57 06/10/2021    HGB 12.8 06/10/2021    HCT 38.7 06/10/2021    MCV 87.4 06/10/2021     06/10/2021     Lab Results   Component Value Date     06/10/2021    K 4.3 06/10/2021     06/10/2021    CO2 30 (H) 06/10/2021    BUN 13 06/10/2021    CREATININE 0.7 06/10/2021    GLUCOSE 107 (H) 06/10/2021    CALCIUM 9.7 06/10/2021    PROT 7.4 06/10/2021    LABALBU 4.6 06/10/2021    BILITOT 0.8 06/10/2021    ALKPHOS 81 06/10/2021    AST 28 06/10/2021    ALT 24 06/10/2021    LABGLOM >60 06/10/2021    GLOB 2.8 06/10/2021     MRI/MRA largely normal.     MRI C-spine largely negative as well. Labs negative. ASSESSMENT:    Perry Staley is a 60y.o. year old female here for neck pain and atypical neuralgia. MRI/MRA brain largely negative. MRI C-spine with mild ddd. Symptoms are not overtly c/w TN. Breast cancer history noted but no evidence of brain metastasis. Anxiety has responded to lexapro but some still noted, no SI/HI. Remains off Neurontin. Doing about the same since last seen. PLAN:  1. Re-request prior CD results imaging results again today. 2.  Off Neurontin, will restart 300 mg tid if worsens. 3.  Continue lexapro at 20 mg daily for anxiety. Consider psych referral if worsens.      Deisy Aceves,   Board Certified Neurology

## 2021-06-24 ENCOUNTER — OFFICE VISIT (OUTPATIENT)
Dept: HEMATOLOGY | Age: 56
End: 2021-06-24
Payer: COMMERCIAL

## 2021-06-24 ENCOUNTER — HOSPITAL ENCOUNTER (OUTPATIENT)
Dept: INFUSION THERAPY | Age: 56
Discharge: HOME | End: 2021-06-24
Payer: COMMERCIAL

## 2021-06-24 VITALS
HEIGHT: 61 IN | SYSTOLIC BLOOD PRESSURE: 124 MMHG | OXYGEN SATURATION: 95 % | WEIGHT: 141.2 LBS | BODY MASS INDEX: 26.66 KG/M2 | HEART RATE: 72 BPM | DIASTOLIC BLOOD PRESSURE: 72 MMHG

## 2021-06-24 DIAGNOSIS — M85.88 OSTEOPENIA OF LUMBAR SPINE: ICD-10-CM

## 2021-06-24 DIAGNOSIS — Z85.3 PERSONAL HISTORY OF BREAST CANCER: ICD-10-CM

## 2021-06-24 DIAGNOSIS — R97.8 ELEVATED CA 15-3 LEVEL: ICD-10-CM

## 2021-06-24 DIAGNOSIS — Z12.31 ENCOUNTER FOR SCREENING MAMMOGRAM FOR BREAST CANCER: ICD-10-CM

## 2021-06-24 DIAGNOSIS — Z85.3 ENCOUNTER FOR FOLLOW-UP SURVEILLANCE OF BREAST CANCER: ICD-10-CM

## 2021-06-24 DIAGNOSIS — Z08 ENCOUNTER FOR FOLLOW-UP SURVEILLANCE OF BREAST CANCER: ICD-10-CM

## 2021-06-24 DIAGNOSIS — Z85.3 PERSONAL HISTORY OF BREAST CANCER: Primary | ICD-10-CM

## 2021-06-24 LAB
BASOPHILS ABSOLUTE: 0.03 K/UL (ref 0.01–0.08)
BASOPHILS RELATIVE PERCENT: 0.4 % (ref 0.1–1.2)
EOSINOPHILS ABSOLUTE: 0.23 K/UL (ref 0.04–0.54)
EOSINOPHILS RELATIVE PERCENT: 3.2 % (ref 0.7–7)
HCT VFR BLD CALC: 39.4 % (ref 34.1–44.9)
HEMOGLOBIN: 12.4 G/DL (ref 11.2–15.7)
LYMPHOCYTES ABSOLUTE: 2.48 K/UL (ref 1.18–3.74)
LYMPHOCYTES RELATIVE PERCENT: 34 % (ref 19.3–53.1)
MCH RBC QN AUTO: 28.8 PG (ref 25.6–32.2)
MCHC RBC AUTO-ENTMCNC: 31.5 G/DL (ref 32.3–35.5)
MCV RBC AUTO: 91.6 FL (ref 79.4–94.8)
MONOCYTES ABSOLUTE: 0.55 K/UL (ref 0.24–0.82)
MONOCYTES RELATIVE PERCENT: 7.5 % (ref 4.7–12.5)
NEUTROPHILS ABSOLUTE: 4 K/UL (ref 1.56–6.13)
NEUTROPHILS RELATIVE PERCENT: 54.9 % (ref 34–71.1)
PDW BLD-RTO: 12.4 % (ref 11.7–14.4)
PLATELET # BLD: 256 K/UL (ref 182–369)
PMV BLD AUTO: 9.3 FL (ref 7.4–10.4)
RBC # BLD: 4.3 M/UL (ref 3.93–5.22)
WBC # BLD: 7.29 K/UL (ref 3.98–10.04)

## 2021-06-24 PROCEDURE — 85025 COMPLETE CBC W/AUTO DIFF WBC: CPT

## 2021-06-24 PROCEDURE — 36415 COLL VENOUS BLD VENIPUNCTURE: CPT

## 2021-06-24 PROCEDURE — 99211 OFF/OP EST MAY X REQ PHY/QHP: CPT

## 2021-06-24 PROCEDURE — 99213 OFFICE O/P EST LOW 20 MIN: CPT | Performed by: NURSE PRACTITIONER

## 2021-06-24 RX ORDER — ACETAMINOPHEN 160 MG
1 TABLET,DISINTEGRATING ORAL DAILY
COMMUNITY

## 2021-06-27 ASSESSMENT — ENCOUNTER SYMPTOMS
CONSTIPATION: 0
COUGH: 0
EYE ITCHING: 0
SHORTNESS OF BREATH: 0
EYE DISCHARGE: 0
TROUBLE SWALLOWING: 0
SORE THROAT: 0
VOMITING: 0
NAUSEA: 0
WHEEZING: 0
ABDOMINAL PAIN: 0

## 2021-07-26 ENCOUNTER — OFFICE VISIT (OUTPATIENT)
Dept: OBSTETRICS AND GYNECOLOGY | Facility: CLINIC | Age: 56
End: 2021-07-26

## 2021-07-26 VITALS
BODY MASS INDEX: 25.49 KG/M2 | HEIGHT: 61 IN | WEIGHT: 135 LBS | DIASTOLIC BLOOD PRESSURE: 84 MMHG | SYSTOLIC BLOOD PRESSURE: 128 MMHG

## 2021-07-26 DIAGNOSIS — Z12.4 CERVICAL CANCER SCREENING: ICD-10-CM

## 2021-07-26 DIAGNOSIS — Z01.419 WELL WOMAN EXAM WITH ROUTINE GYNECOLOGICAL EXAM: Primary | ICD-10-CM

## 2021-07-26 PROCEDURE — G0123 SCREEN CERV/VAG THIN LAYER: HCPCS | Performed by: NURSE PRACTITIONER

## 2021-07-26 PROCEDURE — 87624 HPV HI-RISK TYP POOLED RSLT: CPT | Performed by: NURSE PRACTITIONER

## 2021-07-26 PROCEDURE — 99396 PREV VISIT EST AGE 40-64: CPT | Performed by: NURSE PRACTITIONER

## 2021-07-26 RX ORDER — ESCITALOPRAM OXALATE 20 MG/1
20 TABLET ORAL DAILY
COMMUNITY
Start: 2021-07-01

## 2021-07-26 RX ORDER — LOTEPREDNOL ETABONATE 3.8 MG/G
1 GEL OPHTHALMIC 2 TIMES DAILY
COMMUNITY
Start: 2021-07-13

## 2021-07-26 RX ORDER — OXYBUTYNIN CHLORIDE 5 MG/1
5 TABLET, EXTENDED RELEASE ORAL DAILY
COMMUNITY
Start: 2021-06-30

## 2021-07-26 NOTE — PROGRESS NOTES
Subjective   Rebekah Dickson is a 56 y.o. female  YOB: 1965        Chief Complaint   Patient presents with   • Gynecologic Exam     Patient is here for annual, pt has no complaints       Gynecologic Exam  The patient's pertinent negatives include no pelvic pain. Pertinent negatives include no abdominal pain, back pain, constipation, diarrhea, dysuria, fever, frequency, hematuria, nausea, rash, sore throat, urgency or vomiting.       The following portions of the patient's history were reviewed and updated as appropriate: allergies, current medications, past family history, past medical history, past social history, past surgical history and problem list.    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   • Carcinosarcoma (CMS/HCC)    • Heart murmur    • Hyperlipidemia        Family History   Problem Relation Age of Onset   • Breast cancer Maternal Aunt    • No Known Problems Mother    • No Known Problems Father    • Heart attack Sister    • Diabetes Sister    • Diabetes Maternal Grandmother    • Lung cancer Maternal Grandmother    • Ovarian cancer Neg Hx    • Uterine cancer Neg Hx    • Colon cancer Neg Hx    • Melanoma Neg Hx        Social History     Socioeconomic History   • Marital status:      Spouse name: Not on file   • Number of children: Not on file   • Years of education: Not on file   • Highest education level: Not on file   Tobacco Use   • Smoking status: Never Smoker   • Smokeless tobacco: Never Used   Substance and Sexual Activity   • Alcohol use: No   • Drug use: No   • Sexual activity: Defer         Current Outpatient Medications:   •  Calcium Carbonate-Vit D-Min (CALCIUM 1200 PO), Take  by mouth., Disp: , Rfl:   •  cholecalciferol (VITAMIN D3) 1000 units tablet, Take 1,000 Units by mouth Daily.,  Disp: , Rfl:   •  escitalopram (LEXAPRO) 20 MG tablet, Take 20 mg by mouth Daily., Disp: , Rfl:   •  Lotemax SM 0.38 % gel, Administer 1 drop to both eyes 2 (Two) Times a Day., Disp: , Rfl:   •  Omega-3 Fatty Acids (FISH OIL) 1000 MG capsule capsule, Take 1,000 mg by mouth Daily With Breakfast., Disp: , Rfl:   •  oxybutynin XL (DITROPAN-XL) 5 MG 24 hr tablet, Take 5 mg by mouth Daily., Disp: , Rfl:   •  therapeutic multivitamin-minerals (THERAGRAN-M) tablet, Take 1 tablet by mouth., Disp: , Rfl:     No LMP recorded. Patient is postmenopausal.    Sexual History:           Could not be calculated    Past Surgical History:   Procedure Laterality Date   • BREAST LUMPECTOMY Left    • CERVICAL CONIZATION, LEEP     •  SECTION      x2   • CHOLECYSTECTOMY     • PORTACATH PLACEMENT     • TONSILLECTOMY         Review of Systems   Constitutional: Negative for activity change, appetite change, fatigue, fever, unexpected weight gain and unexpected weight loss.   HENT: Negative for congestion, ear pain, hearing loss, nosebleeds, rhinorrhea, sore throat, tinnitus and trouble swallowing.    Eyes: Negative for blurred vision, pain, discharge, itching and visual disturbance.   Respiratory: Negative for apnea, chest tightness, shortness of breath and wheezing.    Cardiovascular: Negative for chest pain and leg swelling.   Gastrointestinal: Negative for abdominal pain, blood in stool, constipation, diarrhea, nausea, vomiting and GERD.   Endocrine: Negative for heat intolerance, polydipsia and polyuria.   Genitourinary: Negative for breast lump, decreased libido, difficulty urinating, dyspareunia, dysuria, frequency, genital sores, hematuria, menstrual problem, pelvic pain, urgency, urinary incontinence and vaginal pain.   Musculoskeletal: Negative for arthralgias, back pain, joint swelling and myalgias.   Skin: Negative for color change, rash and skin lesions.   Allergic/Immunologic: Negative for environmental allergies,  food allergies and immunocompromised state.   Neurological: Negative for dizziness, tremors, seizures, syncope, facial asymmetry, numbness and headache.   Hematological: Negative for adenopathy. Does not bruise/bleed easily.   Psychiatric/Behavioral: Negative for agitation, hallucinations, sleep disturbance, suicidal ideas and depressed mood. The patient is not nervous/anxious.        Objective   Physical Exam  Vitals and nursing note reviewed. Exam conducted with a chaperone present.   Constitutional:       General: She is not in acute distress.     Appearance: She is well-developed. She is not diaphoretic.   HENT:      Head: Normocephalic.      Right Ear: External ear normal.      Left Ear: External ear normal.      Nose: Nose normal.   Eyes:      General: No scleral icterus.        Right eye: No discharge.         Left eye: No discharge.      Conjunctiva/sclera: Conjunctivae normal.      Pupils: Pupils are equal, round, and reactive to light.   Neck:      Thyroid: No thyromegaly.      Vascular: No carotid bruit.      Trachea: No tracheal deviation.   Cardiovascular:      Rate and Rhythm: Normal rate and regular rhythm.      Heart sounds: Normal heart sounds. No murmur heard.     Pulmonary:      Effort: Pulmonary effort is normal. No respiratory distress.      Breath sounds: Normal breath sounds. No wheezing.   Chest:      Breasts: Breasts are symmetrical.         Right: Normal. No swelling, bleeding, inverted nipple, mass, nipple discharge, skin change or tenderness.         Left: Normal. No swelling, bleeding, inverted nipple, mass, nipple discharge, skin change or tenderness.   Abdominal:      General: There is no distension.      Palpations: Abdomen is soft. There is no mass.      Tenderness: There is no abdominal tenderness. There is no right CVA tenderness, left CVA tenderness or guarding.      Hernia: No hernia is present. There is no hernia in the left inguinal area or right inguinal area.    Genitourinary:     General: Normal vulva.      Exam position: Lithotomy position.      Labia:         Right: No rash, tenderness, lesion or injury.         Left: No rash, tenderness, lesion or injury.       Vagina: Normal. No signs of injury and foreign body. No vaginal discharge, erythema, tenderness or bleeding.      Cervix: Normal.      Uterus: Normal. Not enlarged, not fixed and not tender.       Adnexa: Right adnexa normal and left adnexa normal.        Right: No mass, tenderness or fullness.          Left: No mass, tenderness or fullness.        Rectum: Normal. No mass.      Comments:   BSU normal  Urethral meatus  Normal  Perineum  Normal  Musculoskeletal:         General: No tenderness. Normal range of motion.      Cervical back: Normal range of motion and neck supple.   Lymphadenopathy:      Head:      Right side of head: No submental, submandibular, tonsillar, preauricular, posterior auricular or occipital adenopathy.      Left side of head: No submental, submandibular, tonsillar, preauricular, posterior auricular or occipital adenopathy.      Cervical: No cervical adenopathy.      Right cervical: No superficial, deep or posterior cervical adenopathy.     Left cervical: No superficial, deep or posterior cervical adenopathy.      Upper Body:      Right upper body: No supraclavicular, axillary or pectoral adenopathy.      Left upper body: No supraclavicular, axillary or pectoral adenopathy.      Lower Body: No right inguinal adenopathy. No left inguinal adenopathy.   Skin:     General: Skin is warm and dry.      Findings: No bruising, erythema or rash.   Neurological:      Mental Status: She is alert and oriented to person, place, and time.      Coordination: Coordination normal.   Psychiatric:         Mood and Affect: Mood normal.         Behavior: Behavior normal.         Thought Content: Thought content normal.         Judgment: Judgment normal.           Vitals:    07/26/21 1015   BP: 128/84   Weight:  "61.2 kg (135 lb)   Height: 153.7 cm (60.5\")       Diagnoses and all orders for this visit:    1. Well woman exam with routine gynecological exam (Primary)  Comments:  Normal well woman exam.  ThinPrep Pap smear done.  Mammograms are ordered and managed by Dr. Liang's office.    2. Cervical cancer screening  Comments:  ThinPrep Pap smear done.        Normal GYN exam. Will have lab work here. Encouraged SBE.  Pt is aware how to do self breast exam and the importance of same. Discussed weight management and importance of maintaining a healthy weight. Discussed Vitamin D intake and the importance of adequate vitamin D for both bone health and a healthy immune system.  Discussed daily exercise and the importance of same in regards to a healthy heart as well as helping to maintain her weight and improving her mental health.  Body mass index is 25.93 kg/m². Colonoscopy is up to date.  Mammogram will be scheduled at [facility]. Pap smear is done per ASCCP guidelines.    Patient's Body mass index is 25.93 kg/m². indicating that she is overweight (BMI 25-29.9). Obesity-related health conditions include the following: dyslipidemias. Obesity is unchanged. BMI is is above average; BMI management plan is completed. We discussed portion control and increasing exercise..             Non-Smoker    MyChart Instructions Given       "

## 2021-07-28 LAB
GEN CATEG CVX/VAG CYTO-IMP: NORMAL
HPV I/H RISK 4 DNA CVX QL PROBE+SIG AMP: NOT DETECTED
LAB AP CASE REPORT: NORMAL
LAB AP GYN ADDITIONAL INFORMATION: NORMAL
LAB AP GYN OTHER FINDINGS: NORMAL
PATH INTERP SPEC-IMP: NORMAL
STAT OF ADQ CVX/VAG CYTO-IMP: NORMAL

## 2021-08-10 ENCOUNTER — TELEPHONE (OUTPATIENT)
Dept: OBSTETRICS AND GYNECOLOGY | Facility: CLINIC | Age: 56
End: 2021-08-10

## 2021-08-10 NOTE — TELEPHONE ENCOUNTER
Pt called earlier regarding pap results, per Sonia, they are normal. Called to inform pt of this, no answer, left voicemail to return my call at her earliest convenience.

## 2021-12-15 ENCOUNTER — HOSPITAL ENCOUNTER (OUTPATIENT)
Dept: INFUSION THERAPY | Age: 56
End: 2021-12-15

## 2021-12-17 ENCOUNTER — TELEPHONE (OUTPATIENT)
Dept: NEUROSURGERY | Age: 56
End: 2021-12-17

## 2021-12-17 NOTE — TELEPHONE ENCOUNTER
Called patient to remind her of her appt on 12/20. Left detailed voicemail with call back number [phone].

## 2021-12-23 NOTE — TELEPHONE ENCOUNTER
Requested Prescriptions     Pending Prescriptions Disp Refills    escitalopram (LEXAPRO) 20 MG tablet [Pharmacy Med Name: Escitalopram Oxalate 20 MG Oral Tablet] 30 tablet 0     Sig: Take 1 tablet by mouth once daily       Last Office Visit:  6/23/2021  Next Office Visit:  2/23/2022  Last Medication Refill:  05/15/2021  Gretta Corbett up to date:  Up to date    *RX updated to reflect   12/23/2021  fill date*

## 2021-12-27 RX ORDER — ESCITALOPRAM OXALATE 20 MG/1
TABLET ORAL
Qty: 30 TABLET | Refills: 5 | Status: SHIPPED | OUTPATIENT
Start: 2021-12-27 | End: 2022-08-31 | Stop reason: SDUPTHER

## 2022-08-30 ENCOUNTER — TELEPHONE (OUTPATIENT)
Dept: NEUROSURGERY | Age: 57
End: 2022-08-30

## 2022-08-30 ENCOUNTER — OFFICE VISIT (OUTPATIENT)
Dept: NEUROSURGERY | Age: 57
End: 2022-08-30
Payer: COMMERCIAL

## 2022-08-30 VITALS
BODY MASS INDEX: 26.62 KG/M2 | HEIGHT: 61 IN | OXYGEN SATURATION: 97 % | SYSTOLIC BLOOD PRESSURE: 122 MMHG | WEIGHT: 141 LBS | HEART RATE: 71 BPM | DIASTOLIC BLOOD PRESSURE: 74 MMHG

## 2022-08-30 DIAGNOSIS — F41.9 ANXIETY: ICD-10-CM

## 2022-08-30 DIAGNOSIS — M54.2 NECK PAIN: ICD-10-CM

## 2022-08-30 DIAGNOSIS — R51.9 HEADACHE, UNSPECIFIED HEADACHE TYPE: Primary | ICD-10-CM

## 2022-08-30 PROCEDURE — 99213 OFFICE O/P EST LOW 20 MIN: CPT | Performed by: PSYCHIATRY & NEUROLOGY

## 2022-08-30 NOTE — PROGRESS NOTES
[facility] Neurology Office Note      Patient:   Crystal Glasgow  MR#:    [id number]  Account Number:                         YOB: 1965  Date of Evaluation:  8/30/2022  Time of Note:                          11:50 AM  Primary/Referring Physician:  Carey Mcguire MD  Consulting Physician:  Delonte Portillo DO    FOLLOW UP VISIT    Chief Complaint   Patient presents with    6 Month Follow-Up    Headache       HISTORY OF PRESENT ILLNESS    Crystal Glasgow is a 59y.o. year old female here for headaches and neck pain. Prior MRI's, Brain, C-spine, MRA largely negative. Remains off neurontin. Pain is doing about the same overall since last seen. Lexapro has helped with the anxiety, no SI/HI. Patient still describes atypical neuralgia, burning paresthesias over her scalp, will last a few seconds, and occur multiple times a day. Noting neck pain, sharp pain there at times as well. Largely unchanged since last seen. Waxing and waning. No overt photophobia, nausea, or positional component. No worsening with valsalva. Not triggered by touch, no clear triggers identified. Had a CD performed in [place] previously, still no records. Breast cancer history noted. No scalp tenderness or jaw claudication. No other complaints today.       Past Medical History:   Diagnosis Date    Breast cancer ([address] 2000    left    Breast mass, right 8/28/2012    Chronic sinusitis     Dry eye syndrome     Gallstones     GERD (gastroesophageal reflux disease)     Ovarian cyst        Past Surgical History:   Procedure Laterality Date    BREAST BIOPSY  3/18/13    right needle biopsy -    RIGHT BREAST 7 O'CLOCK POSITION, NO CALCS:    BREAST SURGERY  2000    left partial mastectomy with SND- carcino sarcoma - Dr Sam Perez  5/6/13    FAUSTINO  1995    Dr Eliecer El       Family History   Problem Relation Age of Onset    Cancer Maternal Aunt         breast Diabetes Maternal Grandmother     Cancer Maternal Grandmother         lung    Diabetes Maternal Grandfather     Heart Disease Maternal Grandfather     Other Sister         cholecystitis    Cancer Half-Sister        Social History     Socioeconomic History    Marital status:      Spouse name: Not on file    Number of children: Not on file    Years of education: Not on file    Highest education level: Not on file   Occupational History    Not on file   Tobacco Use    Smoking status: Never    Smokeless tobacco: Never   Vaping Use    Vaping Use: Never used   Substance and Sexual Activity    Alcohol use: No    Drug use: No    Sexual activity: Yes     Partners: Male   Other Topics Concern    Not on file   Social History Narrative    Not on file     Social Determinants of Health     Financial Resource Strain: Not on file   Food Insecurity: Not on file   Transportation Needs: Not on file   Physical Activity: Not on file   Stress: Not on file   Social Connections: Not on file   Intimate Partner Violence: Not on file   Housing Stability: Not on file       Current Outpatient Medications   Medication Sig Dispense Refill    escitalopram (LEXAPRO) 20 MG tablet Take 1 tablet by mouth once daily 30 tablet 5    Cholecalciferol (VITAMIN D3) 50 MCG (2000 UT) CAPS Take 1 capsule by mouth daily      oxybutynin (DITROPAN-XL) 5 MG extended release tablet       NONFORMULARY Indications: DigestZen       therapeutic multivitamin-minerals (THERAGRAN-M) tablet Take 1 tablet by mouth daily. fish oil-omega-3 fatty acids 1000 MG capsule Take 2 g by mouth daily. No current facility-administered medications for this visit.        Allergies   Allergen Reactions    Codeine      hallucinations       REVIEW OF SYSTEMS    Constitutional: []Fever []Sweat []Chills [] Recent Injury [x] Denies all unless marked  HEENT:[x]Headache  [] Head Injury/Hearing Loss  [] Sore Throat  [] Ear Ache/Dizziness  [x] Denies all unless marked  Spine:  [] Neck pain  [] Back pain  [] Sciaticia  [x] Denies all unless marked  Cardiovascular:[]Heart Disease []Chest Pain [] Palpitations  [x] Denies all unless marked  Pulmonary: []Shortness of Breath []Cough   [x] Denies all unless marke  Gastrointestinal: []Nausea  []Vomiting  []Abdominal Pain  []Constipation  []Diarrhea  []Dark Bloody Stools  [x] Denies all unless marked  Psychiatric/Behavioral:[] Depression [] Anxiety [x] Denies all unless marked  Genitourinary:   [] Frequency  [] Urgency  [] Incontinence [] Pain with Urination  [x] Denies all unless marked  Extremities: []Pain  []Swelling  [x] Denies all unless marked  Musculoskeletal: [] Muscle Pain  [] Joint Pain  [] Arthritis [] Muscle Cramps [] Muscle Twitches  [x] Denies all unless marked  Sleep: [] Insomnia [] Snoring [] Restless Legs [] Sleep Apnea  [] Daytime Sleepiness  [x] Denies all unless marked  Skin:[] Rash [] Skin Discoloration [x] Denies all unless marked   Neurological: []Visual Disturbance/Memory Loss [] Loss of Balance [] Slurred Speech/Weakness [] Seizures  [] Vertigo/Dizziness [x] Denies all unless marked        I have reviewed the above ROS with the patient and agree with the ROS as documented above. PHYSICAL EXAM    Constitutional -   /74   Pulse 71   Ht 5' 1\" (1.549 m)   Wt 141 lb (64 kg)   LMP 09/18/2019   SpO2 97%   BMI 26.64 kg/m²   General appearance: No acute distress   EYES -   Conjunctiva normal  Pupillary exam as below, see CN exam in the neurologic exam  ENT-    No scars, masses, or lesions over external nose or ears  Hearing normal bilaterally to finger rub  Cardiovascular -   No clubbing, cyanosis, or edema   Pulmonary-   Good expansion, normal effort without use of accessory muscles  Musculoskeletal -   No significant wasting of muscles noted  Gait as below, see gait exam in the neurologic exam  Muscle strength, tone, stability as below.    No bony deformities  Skin -   Warm, dry, and intact to inspection and palpation. No rash, erythema, or pallor  Psychiatric -   Mood, affect, and behavior appear normal    Memory as below see mental status examination in the neurologic exam    NEUROLOGICAL EXAM    Mental status   [x]Awake, alert, oriented   [x]Affect attention and concentration appear appropriate  [x]Recent and remote memory appears unremarkable  [x]Speech normal without dysarthria or aphasia, comprehension and repetition intact. COMMENTS:    Cranial Nerves [x]No VF deficit to confrontation  [x]PERRLA, EOMI, no nystagmus, conjugate eye movements, no ptosis  [x]Face symmetric  [x]Facial sensation intact  [x]Tongue midline no atrophy or fasciculations present  [x]Palate midline, hearing to finger rub normal bilaterally  [x]Shoulder shrug and SCM testing normal bilaterally  COMMENTS:   Motor   [x]5/5 strength x 4 extremities  [x]Normal bulk and tone  [x]No tremor present  [x]No rigidity or bradykinesia noted  COMMENTS:   Sensory  [x]Sensation intact to light touch, pin prick, vibration, and proprioception BLE  []Sensation intact to light touch, pin prick, vibration, and proprioception BUE  COMMENTS:   Coordination [x]FTN normal bilaterally   []HTS normal bilaterally  []LINDA normal bilaterally.    COMMENTS:   Reflexes  [x]Symmetric and non-pathological  [x]Toes down going bilaterally  [x]No clonus present  COMMENTS:   Gait                  [x]Normal steady gait    []Ataxic    []Spastic     []Magnetic     []Shuffling  COMMENTS:       LABS RECORD AND IMAGING REVIEW (As below and per HPI)      Lab Results   Component Value Date    WBC 7.29 06/24/2021    HGB 12.4 06/24/2021    HCT 39.4 06/24/2021    MCV 91.6 06/24/2021     06/24/2021     Lab Results   Component Value Date     06/10/2021    K 4.3 06/10/2021     06/10/2021    CO2 30 (H) 06/10/2021    BUN 13 06/10/2021    CREATININE 0.7 06/10/2021    GLUCOSE 107 (H) 06/10/2021    CALCIUM 9.7 06/10/2021    PROT 7.4 06/10/2021    LABALBU 4.6 06/10/2021 BILITOT 0.8 06/10/2021    ALKPHOS 81 06/10/2021    AST 28 06/10/2021    ALT 24 06/10/2021    LABGLOM >60 06/10/2021    GLOB 2.8 06/10/2021     MRI/MRA largely normal.     MRI C-spine largely negative as well. Labs negative. ASSESSMENT:    Grazyna Hall is a 59y.o. year old female here for neck pain and atypical neuralgia. MRI/MRA brain largely negative. MRI C-spine with mild ddd. Symptoms are not overtly c/w TN. Breast cancer history noted but no evidence of brain metastasis. Anxiety has responded to lexapro, no SI/HI. Remains off Neurontin. Doing about the same since last seen. PLAN:  1. Re-request prior CD results imaging results again today. 2.  Off Neurontin, will restart 300 mg tid if worsens. 3.  Continue lexapro at 20 mg daily for anxiety. Consider psych referral if worsens.      Tenisha Ferguson,   Board Certified Neurology

## 2022-08-31 DIAGNOSIS — F41.9 ANXIETY: Primary | ICD-10-CM

## 2022-08-31 RX ORDER — ESCITALOPRAM OXALATE 20 MG/1
TABLET ORAL
Qty: 30 TABLET | Refills: 11 | Status: SHIPPED | OUTPATIENT
Start: 2022-08-31

## 2022-10-31 ENCOUNTER — HOSPITAL ENCOUNTER (OUTPATIENT)
Dept: INFUSION THERAPY | Age: 57
Discharge: HOME | End: 2022-10-31
Payer: COMMERCIAL

## 2022-10-31 DIAGNOSIS — Z85.3 PERSONAL HISTORY OF BREAST CANCER: ICD-10-CM

## 2022-10-31 DIAGNOSIS — R97.8 ELEVATED CA 15-3 LEVEL: ICD-10-CM

## 2022-10-31 LAB
ALBUMIN SERPL-MCNC: 4.9 G/DL (ref 3.5–5.2)
ALP BLD-CCNC: 87 U/L (ref 35–104)
ALT SERPL-CCNC: 22 U/L (ref 5–33)
ANION GAP SERPL CALCULATED.3IONS-SCNC: 7 MMOL/L (ref 7–19)
AST SERPL-CCNC: 23 U/L (ref 5–32)
BILIRUB SERPL-MCNC: 0.9 MG/DL (ref 0.2–1.2)
BUN BLDV-MCNC: 13 MG/DL (ref 6–20)
CA 15-3: 30 U/ML (ref 0–25)
CALCIUM SERPL-MCNC: 10 MG/DL (ref 8.6–10)
CHLORIDE BLD-SCNC: 102 MMOL/L (ref 98–111)
CO2: 31 MMOL/L (ref 22–29)
CREAT SERPL-MCNC: 0.6 MG/DL (ref 0.5–0.9)
GFR SERPL CREATININE-BSD FRML MDRD: >60 ML/MIN/{1.73_M2}
GLUCOSE BLD-MCNC: 94 MG/DL (ref 74–109)
HCT VFR BLD CALC: 39.6 % (ref 34.1–44.9)
HEMOGLOBIN: 13 G/DL (ref 11.2–15.7)
LYMPHOCYTES ABSOLUTE: 2.47 K/UL (ref 1.18–3.74)
LYMPHOCYTES RELATIVE PERCENT: 35.3 % (ref 19.3–53.1)
MCH RBC QN AUTO: 29 PG (ref 25.6–32.2)
MCHC RBC AUTO-ENTMCNC: 32.8 G/DL (ref 32.3–35.5)
MCV RBC AUTO: 88.2 FL (ref 79.4–94.8)
MONOCYTES ABSOLUTE: 0.49 K/UL (ref 0.24–0.82)
MONOCYTES RELATIVE PERCENT: 7 % (ref 4.7–12.5)
NEUTROPHILS ABSOLUTE: 3.77 K/UL (ref 1.56–6.13)
NEUTROPHILS RELATIVE PERCENT: 53.9 % (ref 34–71.1)
PDW BLD-RTO: 13 % (ref 11.7–14.4)
PLATELET # BLD: 316 K/UL (ref 182–369)
PMV BLD AUTO: 8.5 FL (ref 7.4–10.4)
POTASSIUM SERPL-SCNC: 4.8 MMOL/L (ref 3.5–5)
RBC # BLD: 4.49 M/UL (ref 3.93–5.22)
SODIUM BLD-SCNC: 140 MMOL/L (ref 136–145)
TOTAL PROTEIN: 7.2 G/DL (ref 6.6–8.7)
WBC # BLD: 7 K/UL (ref 3.98–10.04)

## 2022-10-31 PROCEDURE — 36415 COLL VENOUS BLD VENIPUNCTURE: CPT | Performed by: NURSE PRACTITIONER

## 2022-10-31 PROCEDURE — 85025 COMPLETE CBC W/AUTO DIFF WBC: CPT

## 2022-10-31 PROCEDURE — 36415 COLL VENOUS BLD VENIPUNCTURE: CPT

## 2022-11-04 NOTE — PROGRESS NOTES
Progress Note      Pt Name: Leandra Rodriguez  YOB: 1965  MRN: [id number]    Date of evaluation: 11/7/2022  History Obtained From:  Patient, EMR    Chief Complaint   Patient presents with    Follow-up     Personal history of breast cancer     HISTORY OF PRESENT ILLNESS:    Leandra Rodriguez is a 56-year-old female returning for surveillance of breast cancer. She has a history of lumpectomy 12/21/2000, diagnosed with stage IA triple negative left breast cancer. She received postoperative adjuvant chemoradiation. Roland Batista has been monitored conservatively without evidence of new or recurrent disease. Mammograms 12/9/2020 at [facility] were BI-RADS Category 2. TARGET BREAST CANCER SITES:  Left lumpectomy 12/21/00. 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 The ER and CO were negative. The Her2 Roselyn was also negative. She went on to receive Adriamycin and Cytoxan plus Zenicard for four cycles followed by radiation therapy at 5,040 rads along with a 1,000 rad boost followed subsequently by four more cycles of Taxotere chemotherapy in the adjuvant setting. She has had no evidence of further recurrence of her disease thus far. TREATMENT SUMMARY:  Left lumpectomy 12/21/00. AC x 4. Radiation therapy. Taxotere x 4.     Past Medical History:   Diagnosis Date    Breast cancer ([place]) 2000    left    Breast mass, right 8/28/2012    Chronic sinusitis     Dry eye syndrome     Gallstones     GERD (gastroesophageal reflux disease)     Ovarian cyst      Past Surgical History:   Procedure Laterality Date    BREAST BIOPSY  3/18/13    right needle biopsy -    RIGHT BREAST 7 O'CLOCK POSITION, NO CALCS:    BREAST SURGERY  2000    left partial mastectomy with SND- carcino sarcoma -  [facility]    CHOLECYSTECTOMY  5/6/13    [facility]  1995    Dr Dave Banda     Current Outpatient Medications   Medication Sig Dispense Refill    escitalopram (LEXAPRO) 20 MG tablet Take 1 tablet by mouth once daily 30 tablet 11    Cholecalciferol (VITAMIN D3) 50 MCG (2000 UT) CAPS Take 1 capsule by mouth daily      oxybutynin (DITROPAN-XL) 5 MG extended release tablet Take 5 mg by mouth daily      NONFORMULARY Indications: DigestZen       therapeutic multivitamin-minerals (THERAGRAN-M) tablet Take 1 tablet by mouth daily. fish oil-omega-3 fatty acids 1000 MG capsule Take 2 g by mouth daily. No current facility-administered medications for this visit. Allergies   Allergen Reactions    Codeine      hallucinations     Social History     Tobacco Use    Smoking status: Never    Smokeless tobacco: Never   Vaping Use    Vaping Use: Never used   Substance Use Topics    Alcohol use: No    Drug use: No     Family History   Problem Relation Age of Onset    Cancer Maternal Aunt         breast    Diabetes Maternal Grandmother     Cancer Maternal Grandmother         lung    Diabetes Maternal Grandfather     Heart Disease Maternal Grandfather     Other Sister         cholecystitis    Cancer Half-Sister      Review of Systems   Constitutional:  Negative for fatigue and fever. HENT:  Negative for dental problem, hearing loss, mouth sores, nosebleeds, sore throat and trouble swallowing. Eyes:  Negative for discharge and itching. Respiratory:  Negative for cough, shortness of breath and wheezing. Cardiovascular:  Negative for chest pain, palpitations and leg swelling. Gastrointestinal:  Negative for abdominal pain, constipation, nausea and vomiting. H/o gastroparesis    Endocrine: Negative for cold intolerance and heat intolerance. Genitourinary:  Negative for dysuria, frequency and urgency.         History of hematuria, urinary retention, both improved   Musculoskeletal:  Positive for arthralgias. Negative for joint swelling and myalgias. Skin:  Negative for pallor and rash. Allergic/Immunologic: Negative for environmental allergies and immunocompromised state. Neurological:  Negative for seizures, syncope and numbness. Hematological:  Negative for adenopathy. Does not bruise/bleed easily. Psychiatric/Behavioral:  Negative for agitation, behavioral problems and confusion. Depression, stable   Physical Exam  Vitals reviewed. Constitutional:       General: She is not in acute distress. Appearance: Normal appearance. She is well-developed. She is not toxic-appearing or diaphoretic. HENT:      Head: Normocephalic and atraumatic. Right Ear: External ear normal.      Left Ear: External ear normal.      Nose: Nose normal.      Mouth/Throat:      Mouth: Mucous membranes are moist.   Eyes:      General: No scleral icterus. Right eye: No discharge. Left eye: No discharge. Conjunctiva/sclera: Conjunctivae normal.   Neck:      Trachea: No tracheal deviation. Cardiovascular:      Rate and Rhythm: Normal rate and regular rhythm. Heart sounds: No murmur heard. Pulmonary:      Effort: Pulmonary effort is normal. No respiratory distress. Breath sounds: Normal breath sounds. No wheezing or rales. Chest:   Breasts:     Right: No swelling, bleeding, inverted nipple, mass, nipple discharge, skin change or tenderness. Left: No swelling, bleeding, inverted nipple, mass, nipple discharge, skin change or tenderness. Comments: No new masses, nodules or lymphadenopathies on the left or right. The left lumpectomy site is well-healed. Chronic asymmetrical breasts noted due to postsurgical changes on the left  Abdominal:      General: Bowel sounds are normal. There is no distension. Palpations: Abdomen is soft. Tenderness: There is no abdominal tenderness. There is no guarding. Genitourinary:     Comments: Exam deferred  Musculoskeletal:         General: No tenderness or deformity. Cervical back: Neck supple. Comments: Normal ROM all four extremities   Lymphadenopathy:      Cervical: No cervical adenopathy. Right cervical: No superficial cervical adenopathy. Left cervical: No superficial cervical adenopathy. Upper Body:      Right upper body: No supraclavicular or axillary adenopathy. Left upper body: No supraclavicular or axillary adenopathy. Comments:      Skin:     General: Skin is warm and dry. Findings: No rash. Neurological:      Mental Status: She is alert and oriented to person, place, and time. Comments: follows commands, non-focal   Psychiatric:         Behavior: Behavior normal. Behavior is cooperative. Thought Content: Thought content normal.         Judgment: Judgment normal.      Comments: Alert and oriented to person, place and time.      Vitals:    11/07/22 1050   BP: 122/68   Pulse: 73   SpO2: 97%   Weight: 138 lb (62.6 kg)      Wt Readings from Last 3 Encounters:   11/07/22 138 lb (62.6 kg)   08/30/22 141 lb (64 kg)   06/24/21 141 lb 3.2 oz (64 kg)     LABS:  CBC:   Lab Results   Component Value Date    WBC 7.00 10/31/2022    HGB 13.0 10/31/2022    HCT 39.6 10/31/2022    MCV 88.2 10/31/2022     10/31/2022    LABLYMP 3.24 04/23/2013    LYMPHOPCT 35.3 10/31/2022    RBC 4.49 10/31/2022    MCH 29.0 10/31/2022    MCHC 32.8 10/31/2022    RDW 13.0 10/31/2022     CMP:  Lab Results   Component Value Date     10/31/2022    K 4.8 10/31/2022     10/31/2022    CO2 31 (H) 10/31/2022    BUN 13 10/31/2022    CREATININE 0.6 10/31/2022    GLUCOSE 94 10/31/2022    CALCIUM 10.0 10/31/2022    PROT 7.2 10/31/2022    LABALBU 4.9 10/31/2022    BILITOT 0.9 10/31/2022    ALKPHOS 87 10/31/2022    AST 23 10/31/2022    ALT 22 10/31/2022    LABGLOM >60 10/31/2022    GLOB 2.8 06/10/2021     Labs 10/31/2022:  CA 15-3: 30    ASSESSMENT/PLAN:  1. Personal history of breast cancer    2. Encounter for screening mammogram for breast cancer    3. Elevated CA 15-3 level    4. Postmenopausal    5. Osteopenia of lumbar spine    6. BMI 26.0-26.9,adult      History of left, triple negative breast cancer status post lumpectomy 12/21/2000  Status post adjuvant chemotherapy and radiation  Mammogram 3/29/2022 at [facility]: BI-RADS 2  No evidence of disease by exam    History of elevated CA 15-3  CA 15-3 fluctuates and was 28.4 on 12/6/2019  CA 15-3 was 25.5 on 6/8/2020  CA 15-3 was 27.7 on 12/4/2020  CA 15-3 was 23.9 on 6/10/2021    Osteopenia of lumbar spine/postmenopausal  BMD 5/9/2020 at [facility] revealed osteopenia  Dario Choudhury is overdue for BMD.  She prefers to have scheduled with mammograms after 3/29/2023. Dario Choudhury will continue calcium with vitamin D supplementation    Tumor Screenings   Mammogram 3/29/2022 at [facility]: BI-RADS category 2    Body mass index is 26.07 kg/m². BMI is stable. Federal guidelines recommend that people under the age of 72 should have a BMI of 18.5-25 and people age 72 and older should have a BMI of 23-30. If yours is outside the range, we recommend you utilize a diet and exercise program to get yours into the range. We also recommend you speak with your primary care doctor should any specific advice be needed regarding special diets or programs which would be appropriate for your circumstances. Orders Placed This Encounter   Procedures    DEXA BONE DENSITY 2 SITES    KEYUR DIGITAL SCREEN W OR WO CAD BILATERAL    Cancer Antigen 15-3    Comprehensive Metabolic Panel    CBC with Auto Differential       Return in about 6 months (around 5/7/2023) for follow up with ЕЛЕНА Becker. I have seen, examined and reviewed this patient medication list, appropriate labs and imaging studies. I reviewed relevant medical records and others physicians notes. I discussed the plan of care with the patient.  I answered all questions to the patients satisfaction. I have also reviewed the chief complaint (CC) and part of the history (History of Present Illness (HPI), Past Family Social History [facility]), or Review of Systems (ROS) and made changes when appropriated. Dictated utilizing Dragon transcription software.         ЕЛЕНА Vela  10:25 AM  11/13/2022

## 2022-11-07 ENCOUNTER — HOSPITAL ENCOUNTER (OUTPATIENT)
Dept: INFUSION THERAPY | Age: 57
Discharge: HOME | End: 2022-11-07
Payer: COMMERCIAL

## 2022-11-07 ENCOUNTER — OFFICE VISIT (OUTPATIENT)
Dept: HEMATOLOGY | Age: 57
End: 2022-11-07
Payer: COMMERCIAL

## 2022-11-07 VITALS
OXYGEN SATURATION: 97 % | SYSTOLIC BLOOD PRESSURE: 122 MMHG | HEART RATE: 73 BPM | WEIGHT: 138 LBS | BODY MASS INDEX: 26.07 KG/M2 | DIASTOLIC BLOOD PRESSURE: 68 MMHG

## 2022-11-07 DIAGNOSIS — Z85.3 PERSONAL HISTORY OF BREAST CANCER: Primary | ICD-10-CM

## 2022-11-07 DIAGNOSIS — M85.88 OSTEOPENIA OF LUMBAR SPINE: ICD-10-CM

## 2022-11-07 DIAGNOSIS — Z12.31 ENCOUNTER FOR SCREENING MAMMOGRAM FOR BREAST CANCER: ICD-10-CM

## 2022-11-07 DIAGNOSIS — Z78.0 POSTMENOPAUSAL: ICD-10-CM

## 2022-11-07 DIAGNOSIS — R97.8 ELEVATED CA 15-3 LEVEL: ICD-10-CM

## 2022-11-07 PROCEDURE — 99212 OFFICE O/P EST SF 10 MIN: CPT

## 2022-11-07 PROCEDURE — 99214 OFFICE O/P EST MOD 30 MIN: CPT | Performed by: NURSE PRACTITIONER

## 2022-11-13 ASSESSMENT — ENCOUNTER SYMPTOMS
TROUBLE SWALLOWING: 0
NAUSEA: 0
COUGH: 0
WHEEZING: 0
ABDOMINAL PAIN: 0
VOMITING: 0
EYE DISCHARGE: 0
SORE THROAT: 0
EYE ITCHING: 0
CONSTIPATION: 0
SHORTNESS OF BREATH: 0

## 2023-02-13 ENCOUNTER — HOSPITAL ENCOUNTER (OUTPATIENT)
Dept: INFUSION THERAPY | Age: 58
Discharge: HOME | End: 2023-02-13
Payer: COMMERCIAL

## 2023-02-13 DIAGNOSIS — Z85.3 PERSONAL HISTORY OF BREAST CANCER: ICD-10-CM

## 2023-02-13 DIAGNOSIS — R97.8 ELEVATED CA 15-3 LEVEL: ICD-10-CM

## 2023-02-13 LAB
HCT VFR BLD CALC: 39.1 % (ref 34.1–44.9)
HEMOGLOBIN: 12.7 G/DL (ref 11.2–15.7)
LYMPHOCYTES ABSOLUTE: 2.36 K/UL (ref 1.18–3.74)
LYMPHOCYTES RELATIVE PERCENT: 37.7 % (ref 19.3–53.1)
MCH RBC QN AUTO: 28.6 PG (ref 25.6–32.2)
MCHC RBC AUTO-ENTMCNC: 32.5 G/DL (ref 32.3–35.5)
MCV RBC AUTO: 88.1 FL (ref 79.4–94.8)
MONOCYTES ABSOLUTE: 0.45 K/UL (ref 0.24–0.82)
MONOCYTES RELATIVE PERCENT: 7.2 % (ref 4.7–12.5)
NEUTROPHILS ABSOLUTE: 3.23 K/UL (ref 1.56–6.13)
NEUTROPHILS RELATIVE PERCENT: 51.5 % (ref 34–71.1)
PDW BLD-RTO: 12.5 % (ref 11.7–14.4)
PLATELET # BLD: 319 K/UL (ref 182–369)
PMV BLD AUTO: 9.2 FL (ref 7.4–10.4)
RBC # BLD: 4.44 M/UL (ref 3.93–5.22)
WBC # BLD: 6.26 K/UL (ref 3.98–10.04)

## 2023-02-13 PROCEDURE — 85025 COMPLETE CBC W/AUTO DIFF WBC: CPT

## 2023-02-13 PROCEDURE — 36415 COLL VENOUS BLD VENIPUNCTURE: CPT

## 2023-02-28 ENCOUNTER — OFFICE VISIT (OUTPATIENT)
Dept: NEUROLOGY | Age: 58
End: 2023-02-28
Payer: COMMERCIAL

## 2023-02-28 VITALS
DIASTOLIC BLOOD PRESSURE: 74 MMHG | SYSTOLIC BLOOD PRESSURE: 108 MMHG | WEIGHT: 138 LBS | BODY MASS INDEX: 26.06 KG/M2 | HEIGHT: 61 IN | HEART RATE: 67 BPM | OXYGEN SATURATION: 98 %

## 2023-02-28 DIAGNOSIS — R51.9 HEADACHE, UNSPECIFIED HEADACHE TYPE: Primary | ICD-10-CM

## 2023-02-28 PROBLEM — K31.84 GASTROPARESIS: Status: ACTIVE | Noted: 2023-02-28

## 2023-02-28 PROBLEM — C50.919 MALIGNANT NEOPLASM OF BREAST (HCC): Status: ACTIVE | Noted: 2023-02-28

## 2023-02-28 PROBLEM — C80.1 CARCINOSARCOMA (HCC): Status: ACTIVE | Noted: 2019-06-26

## 2023-02-28 PROBLEM — M85.80 OSTEOPENIA: Status: ACTIVE | Noted: 2023-02-28

## 2023-02-28 PROBLEM — R07.89 CHEST PAIN, ATYPICAL: Status: ACTIVE | Noted: 2019-08-20

## 2023-02-28 PROCEDURE — 99213 OFFICE O/P EST LOW 20 MIN: CPT | Performed by: PSYCHIATRY & NEUROLOGY

## 2023-02-28 RX ORDER — OMEPRAZOLE 20 MG/1
1 CAPSULE, DELAYED RELEASE ORAL 2 TIMES DAILY
COMMUNITY
Start: 2023-02-08

## 2023-02-28 NOTE — PROGRESS NOTES
[facility] Neurology Office Note      Patient:   Hiren Rodrigues  MR#:    [id number]  Account Number:                         YOB: 1965  Date of Evaluation:  2/28/2023  Time of Note:                          11:25 AM  Primary/Referring Physician:  Kristie Buenrostro MD  Consulting Physician:  Josué Aviles DO    FOLLOW UP VISIT    Chief Complaint   Patient presents with    6 Month Follow-Up    Headache       HISTORY OF PRESENT ILLNESS    Hiren Rodrigues is a 59y.o. year old female here for headaches and neck pain. Prior MRI's, Brain, C-spine, MRA largely negative. Off neurontin. Pain is doing about the same overall since last seen. Lexapro has helped with the anxiety, no SI/HI. Patient still describes atypical neuralgia, burning paresthesias over her scalp, will last a few seconds, and occur multiple times a day. Improved since last seen. Noting neck pain, sharp pain there at times as well. Largely unchanged since last seen. Waxing and waning. No overt photophobia, nausea, or positional component. No worsening with valsalva. Not triggered by touch, no clear triggers identified. Had a CD performed in [place] previously, still no records. Breast cancer history noted. No scalp tenderness or jaw claudication. No other complaints today.       Past Medical History:   Diagnosis Date    Breast cancer ([address] 2000    left    Breast mass, right 8/28/2012    Chronic sinusitis     Dry eye syndrome     Gallstones     GERD (gastroesophageal reflux disease)     Ovarian cyst        Past Surgical History:   Procedure Laterality Date    BREAST BIOPSY  3/18/13    right needle biopsy -    RIGHT BREAST 7 O'CLOCK POSITION, NO CALCS:    BREAST SURGERY  2000    left partial mastectomy with SND- carcino sarcoma - Dr Will Nagel  5/6/13    FAUSTINO  1995    Dr Mariana Chavez       Family History   Problem Relation Age of Onset    Cancer Maternal Aunt breast    Diabetes Maternal Grandmother     Cancer Maternal Grandmother         lung    Diabetes Maternal Grandfather     Heart Disease Maternal Grandfather     Other Sister         cholecystitis    Cancer Half-Sister        Social History     Socioeconomic History    Marital status:      Spouse name: Not on file    Number of children: Not on file    Years of education: Not on file    Highest education level: Not on file   Occupational History    Not on file   Tobacco Use    Smoking status: Never    Smokeless tobacco: Never   Vaping Use    Vaping Use: Never used   Substance and Sexual Activity    Alcohol use: No    Drug use: No    Sexual activity: Yes     Partners: Male   Other Topics Concern    Not on file   Social History Narrative    Not on file     Social Determinants of Health     Financial Resource Strain: Not on file   Food Insecurity: Not on file   Transportation Needs: Not on file   Physical Activity: Not on file   Stress: Not on file   Social Connections: Not on file   Intimate Partner Violence: Not on file   Housing Stability: Not on file       Current Outpatient Medications   Medication Sig Dispense Refill    omeprazole (PRILOSEC) 20 MG delayed release capsule Take 1 capsule by mouth 2 times daily      escitalopram (LEXAPRO) 20 MG tablet Take 1 tablet by mouth once daily 30 tablet 11    Cholecalciferol (VITAMIN D3) 50 MCG (2000 UT) CAPS Take 1 capsule by mouth daily      oxybutynin (DITROPAN-XL) 5 MG extended release tablet Take 5 mg by mouth daily      NONFORMULARY Indications: DigestZen       therapeutic multivitamin-minerals (THERAGRAN-M) tablet Take 1 tablet by mouth daily. fish oil-omega-3 fatty acids 1000 MG capsule Take 2 g by mouth daily. No current facility-administered medications for this visit.        Allergies   Allergen Reactions    Codeine      hallucinations       REVIEW OF SYSTEMS    Constitutional: []Fever []Sweat []Chills [] Recent Injury [x] Denies all unless marked  HEENT:[x]Headache  [] Head Injury/Hearing Loss  [] Sore Throat  [] Ear Ache/Dizziness  [] Denies all unless marked  Spine:  [] Neck pain  [] Back pain  [] Sciaticia  [x] Denies all unless marked  Cardiovascular:[]Heart Disease []Chest Pain [] Palpitations  [x] Denies all unless marked  Pulmonary: []Shortness of Breath []Cough   [x] Denies all unless marke  Gastrointestinal: []Nausea  []Vomiting  []Abdominal Pain  []Constipation  []Diarrhea  []Dark Bloody Stools  [x] Denies all unless marked  Psychiatric/Behavioral:[x] Depression [x] Anxiety [x] Denies all unless marked  Genitourinary:   [] Frequency  [] Urgency  [] Incontinence [] Pain with Urination  [x] Denies all unless marked  Extremities: []Pain  []Swelling  [x] Denies all unless marked  Musculoskeletal: [] Muscle Pain  [] Joint Pain  [] Arthritis [] Muscle Cramps [] Muscle Twitches  [x] Denies all unless marked  Sleep: [] Insomnia [] Snoring [] Restless Legs [] Sleep Apnea  [] Daytime Sleepiness  [x] Denies all unless marked  Skin:[] Rash [] Skin Discoloration [x] Denies all unless marked   Neurological: []Visual Disturbance/Memory Loss [] Loss of Balance [] Slurred Speech/Weakness [] Seizures  [] Vertigo/Dizziness [x] Denies all unless marked             I have reviewed the above ROS with the patient and agree with the ROS as documented above.          PHYSICAL EXAM    Constitutional -   /74   Pulse 67   Ht 5' 1\" (1.549 m)   Wt 138 lb (62.6 kg)   LMP 09/18/2019   SpO2 98%   BMI 26.07 kg/m²   General appearance: No acute distress   EYES -   Conjunctiva normal  Pupillary exam as below, see CN exam in the neurologic exam  ENT-    No scars, masses, or lesions over external nose or ears  Hearing normal bilaterally to finger rub  Cardiovascular -   No clubbing, cyanosis, or edema   Pulmonary-   Good expansion, normal effort without use of accessory muscles  Musculoskeletal -   No significant wasting of muscles noted  Gait as below, see gait exam in the neurologic exam  Muscle strength, tone, stability as below. No bony deformities  Skin -   Warm, dry, and intact to inspection and palpation. No rash, erythema, or pallor  Psychiatric -   Mood, affect, and behavior appear normal    Memory as below see mental status examination in the neurologic exam    NEUROLOGICAL EXAM    Mental status   [x]Awake, alert, oriented   [x]Affect attention and concentration appear appropriate  [x]Recent and remote memory appears unremarkable  [x]Speech normal without dysarthria or aphasia, comprehension and repetition intact. COMMENTS:    Cranial Nerves [x]No VF deficit to confrontation  [x]PERRLA, EOMI, no nystagmus, conjugate eye movements, no ptosis  [x]Face symmetric  [x]Facial sensation intact  [x]Tongue midline no atrophy or fasciculations present  [x]Palate midline, hearing to finger rub normal bilaterally  [x]Shoulder shrug and SCM testing normal bilaterally  COMMENTS:   Motor   [x]5/5 strength x 4 extremities  [x]Normal bulk and tone  [x]No tremor present  [x]No rigidity or bradykinesia noted  COMMENTS:   Sensory  [x]Sensation intact to light touch, pin prick, vibration, and proprioception BLE  []Sensation intact to light touch, pin prick, vibration, and proprioception BUE  COMMENTS:   Coordination [x]FTN normal bilaterally   []HTS normal bilaterally  []LINDA normal bilaterally.    COMMENTS:   Reflexes  [x]Symmetric and non-pathological  [x]Toes down going bilaterally  [x]No clonus present  COMMENTS:   Gait                  [x]Normal steady gait    []Ataxic    []Spastic     []Magnetic     []Shuffling  COMMENTS:       LABS RECORD AND IMAGING REVIEW (As below and per HPI)      Lab Results   Component Value Date    WBC 6.26 02/13/2023    HGB 12.7 02/13/2023    HCT 39.1 02/13/2023    MCV 88.1 02/13/2023     02/13/2023     Lab Results   Component Value Date     02/13/2023    K 4.7 02/13/2023     02/13/2023    CO2 25 02/13/2023    BUN 22 02/13/2023 CREATININE 0.7 02/13/2023    GLUCOSE 105 02/13/2023    CALCIUM 9.8 02/13/2023    PROT 7.3 02/13/2023    LABALBU 4.8 02/13/2023    BILITOT 0.6 02/13/2023    ALKPHOS 73 02/13/2023    AST 19 02/13/2023    ALT 20 02/13/2023    LABGLOM >60 02/13/2023    GLOB 2.8 06/10/2021     MRI/MRA largely normal.     MRI C-spine largely negative as well. Labs negative. ASSESSMENT:    Hannah Cleveland is a 59y.o. year old female here for neck pain and atypical neuralgia. MRI/MRA brain largely negative. MRI C-spine with mild ddd. Symptoms are not overtly c/w TN. Breast cancer history noted but no evidence of brain metastasis. Anxiety has responded to lexapro, no SI/HI. Remains off Neurontin, did not restart last time. Doing about the same since last seen. PLAN:  1. Re-request prior CD results imaging results again today. Still non records. 2.  Remains off Neurontin, consider restarting if worsens. 3.  Continue lexapro at 20 mg daily for anxiety. Consider psych referral if worsens.      Kori Burris,   Board Certified Neurology

## 2023-05-01 ENCOUNTER — HOSPITAL ENCOUNTER (OUTPATIENT)
Dept: INFUSION THERAPY | Age: 58
Discharge: HOME | End: 2023-05-01
Payer: COMMERCIAL

## 2023-05-01 DIAGNOSIS — R97.8 ELEVATED CA 15-3 LEVEL: Primary | ICD-10-CM

## 2023-05-01 DIAGNOSIS — R97.8 ELEVATED CA 15-3 LEVEL: ICD-10-CM

## 2023-05-01 DIAGNOSIS — Z85.3 PERSONAL HISTORY OF BREAST CANCER: ICD-10-CM

## 2023-05-01 LAB
CA 15-3: 26 U/ML (ref 0–25)
CEA SERPL-MCNC: 1.8 NG/ML (ref 0–4.7)
ERYTHROCYTE [DISTWIDTH] IN BLOOD BY AUTOMATED COUNT: 12.4 % (ref 11.7–14.4)
HCT VFR BLD AUTO: 40.4 % (ref 34.1–44.9)
HGB BLD-MCNC: 12.7 G/DL (ref 11.2–15.7)
LYMPHOCYTES # BLD: 2.88 K/UL (ref 1.18–3.74)
LYMPHOCYTES NFR BLD: 37.9 % (ref 19.3–53.1)
MCH RBC QN AUTO: 28.2 PG (ref 25.6–32.2)
MCHC RBC AUTO-ENTMCNC: 31.4 G/DL (ref 32.3–35.5)
MCV RBC AUTO: 89.6 FL (ref 79.4–94.8)
MONOCYTES # BLD: 0.51 K/UL (ref 0.24–0.82)
MONOCYTES NFR BLD: 6.7 % (ref 4.7–12.5)
NEUTROPHILS # BLD: 3.98 K/UL (ref 1.56–6.13)
NEUTS SEG NFR BLD: 52.5 % (ref 34–71.1)
PLATELET # BLD AUTO: 329 K/UL (ref 182–369)
PMV BLD AUTO: 9 FL (ref 7.4–10.4)
RBC # BLD AUTO: 4.51 M/UL (ref 3.93–5.22)
WBC # BLD AUTO: 7.59 K/UL (ref 3.98–10.04)

## 2023-05-01 PROCEDURE — 85025 COMPLETE CBC W/AUTO DIFF WBC: CPT

## 2023-05-01 PROCEDURE — 36415 COLL VENOUS BLD VENIPUNCTURE: CPT

## 2023-05-08 DIAGNOSIS — R97.8 ELEVATED CA 15-3 LEVEL: ICD-10-CM

## 2023-05-08 DIAGNOSIS — Z85.3 PERSONAL HISTORY OF BREAST CANCER: Primary | ICD-10-CM

## 2023-05-08 NOTE — PROGRESS NOTES
Progress Note      Pt Name: Neyda Narvaez  YOB: 1965  MRN: [id number]    Date of evaluation: 5/9/2023  History Obtained From:  Patient, EMR    Chief Complaint   Patient presents with    Follow-up     Personal history of breast cancer     HISTORY OF PRESENT ILLNESS:    Neyda Narvaez is a 64-year-old  female returning to the clinic for breast cancer surveillance. Her history was left lumpectomy for stage IA triple negative left breast cancer dating to December, 2000. [facility] received postoperative adjuvant chemoradiation and has been monitored since that time without evidence of new or recurrent disease. Mammograms 3/30/2023 at  [facility]): BI[facility]RADS category 2[facility] masses or axillary lymphadenopathies. She verbalizes concern stating she sees black flecks on her skin at times and is worried she has a parasitic infection. She has been evaluated by dermatology in the past with negative findings. TARGET BREAST CANCER SITES:  Left lumpectomy 12/21/00. 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 The ER and DC were negative. The Her2 Roselyn was also negative. She went on to receive Adriamycin and Cytoxan plus Zenicard for four cycles followed by radiation therapy at 5,040 rads along with a 1,000 rad boost followed subsequently by four more cycles of Taxotere chemotherapy in the adjuvant setting. She has had no evidence of further recurrence of her disease thus far. TREATMENT SUMMARY:  Left lumpectomy 12/21/00. AC x 4. Radiation therapy. Taxotere x 4.     Past Medical History:   Diagnosis Date    Breast cancer ([facility] [address] 2000    left    Breast mass, right 8/28/2012    Chronic sinusitis     Dry eye syndrome

## 2023-05-09 ENCOUNTER — OFFICE VISIT (OUTPATIENT)
Dept: HEMATOLOGY | Age: 58
End: 2023-05-09
Payer: COMMERCIAL

## 2023-05-09 ENCOUNTER — HOSPITAL ENCOUNTER (OUTPATIENT)
Dept: INFUSION THERAPY | Age: 58
Discharge: HOME | End: 2023-05-09
Payer: COMMERCIAL

## 2023-05-09 VITALS
DIASTOLIC BLOOD PRESSURE: 70 MMHG | OXYGEN SATURATION: 98 % | SYSTOLIC BLOOD PRESSURE: 130 MMHG | BODY MASS INDEX: 26.49 KG/M2 | HEART RATE: 72 BPM | WEIGHT: 140.2 LBS

## 2023-05-09 DIAGNOSIS — R97.8 ELEVATED CA 15-3 LEVEL: ICD-10-CM

## 2023-05-09 DIAGNOSIS — Z08 ENCOUNTER FOR FOLLOW-UP SURVEILLANCE OF BREAST CANCER: ICD-10-CM

## 2023-05-09 DIAGNOSIS — Z85.3 ENCOUNTER FOR FOLLOW-UP SURVEILLANCE OF BREAST CANCER: ICD-10-CM

## 2023-05-09 DIAGNOSIS — Z85.3 PERSONAL HISTORY OF BREAST CANCER: Primary | ICD-10-CM

## 2023-05-09 DIAGNOSIS — M85.88 OSTEOPENIA OF LUMBAR SPINE: ICD-10-CM

## 2023-05-09 PROCEDURE — 99212 OFFICE O/P EST SF 10 MIN: CPT

## 2023-05-09 PROCEDURE — 99213 OFFICE O/P EST LOW 20 MIN: CPT | Performed by: NURSE PRACTITIONER

## 2023-05-11 ASSESSMENT — ENCOUNTER SYMPTOMS
VOMITING: 0
NAUSEA: 0
ABDOMINAL PAIN: 0
WHEEZING: 0
CONSTIPATION: 0
COUGH: 0
SHORTNESS OF BREATH: 0
SORE THROAT: 0
TROUBLE SWALLOWING: 0
EYE ITCHING: 0
EYE DISCHARGE: 0

## 2023-09-26 DIAGNOSIS — F41.9 ANXIETY: ICD-10-CM

## 2023-09-26 RX ORDER — ESCITALOPRAM OXALATE 20 MG/1
TABLET ORAL
Qty: 30 TABLET | Refills: 11 | Status: SHIPPED | OUTPATIENT
Start: 2023-09-26

## 2023-09-26 NOTE — TELEPHONE ENCOUNTER
Requested Prescriptions     Pending Prescriptions Disp Refills    escitalopram (LEXAPRO) 20 MG tablet [Pharmacy Med Name: Escitalopram Oxalate 20 MG Oral Tablet] 30 tablet 0     Sig: Take 1 tablet by mouth once daily       Last Office Visit: 8/30/2022  Next Office Visit: Visit date not found  Last Medication Refill: 8/31/2022 with 11 RF

## 2023-11-02 ENCOUNTER — HOSPITAL ENCOUNTER (OUTPATIENT)
Dept: INFUSION THERAPY | Age: 58
Discharge: HOME | End: 2023-11-02
Payer: COMMERCIAL

## 2023-11-02 DIAGNOSIS — Z85.3 PERSONAL HISTORY OF BREAST CANCER: ICD-10-CM

## 2023-11-02 DIAGNOSIS — R97.8 ELEVATED CA 15-3 LEVEL: ICD-10-CM

## 2023-11-02 LAB
ALBUMIN SERPL-MCNC: 4.5 G/DL (ref 3.5–5.2)
ALP SERPL-CCNC: 91 U/L (ref 35–104)
ALT SERPL-CCNC: 16 U/L (ref 5–33)
ANION GAP SERPL CALCULATED.3IONS-SCNC: 13 MMOL/L (ref 7–19)
AST SERPL-CCNC: 15 U/L (ref 5–32)
BILIRUB SERPL-MCNC: 0.5 MG/DL (ref 0.2–1.2)
BUN SERPL-MCNC: 14 MG/DL (ref 8–23)
CA 15-3: 24 U/ML (ref 0–25)
CALCIUM SERPL-MCNC: 9.5 MG/DL (ref 8.8–10.2)
CEA SERPL-MCNC: 1.4 NG/ML (ref 0–4.7)
CHLORIDE SERPL-SCNC: 103 MMOL/L (ref 98–111)
CO2 SERPL-SCNC: 26 MMOL/L (ref 22–29)
CREAT SERPL-MCNC: 0.6 MG/DL (ref 0.5–0.9)
ERYTHROCYTE [DISTWIDTH] IN BLOOD BY AUTOMATED COUNT: 12.6 % (ref 11.7–14.4)
GLUCOSE SERPL-MCNC: 95 MG/DL (ref 74–109)
HCT VFR BLD AUTO: 39.7 % (ref 34.1–44.9)
HGB BLD-MCNC: 12.7 G/DL (ref 11.2–15.7)
LYMPHOCYTES # BLD: 2.4 K/UL (ref 1.18–3.74)
LYMPHOCYTES NFR BLD: 29.6 % (ref 19.3–53.1)
MCH RBC QN AUTO: 28.2 PG (ref 25.6–32.2)
MCHC RBC AUTO-ENTMCNC: 32 G/DL (ref 32.3–35.5)
MCV RBC AUTO: 88 FL (ref 79.4–94.8)
MONOCYTES # BLD: 0.9 K/UL (ref 0.24–0.82)
MONOCYTES NFR BLD: 10.8 % (ref 4.7–12.5)
NEUTROPHILS # BLD: 4.9 K/UL (ref 1.56–6.13)
NEUTS SEG NFR BLD: 59.6 % (ref 34–71.1)
PLATELET # BLD AUTO: 406 K/UL (ref 182–369)
PMV BLD AUTO: 7.8 FL (ref 7.4–10.4)
POTASSIUM SERPL-SCNC: 4.5 MMOL/L (ref 3.5–5)
PROT SERPL-MCNC: 6.8 G/DL (ref 6.6–8.7)
RBC # BLD AUTO: 4.51 M/UL (ref 3.93–5.22)
SODIUM SERPL-SCNC: 142 MMOL/L (ref 136–145)
WBC # BLD AUTO: 8.2 K/UL (ref 3.98–10.04)

## 2023-11-02 PROCEDURE — 85025 COMPLETE CBC W/AUTO DIFF WBC: CPT

## 2023-11-02 PROCEDURE — 36415 COLL VENOUS BLD VENIPUNCTURE: CPT | Performed by: NURSE PRACTITIONER

## 2023-11-02 PROCEDURE — 36415 COLL VENOUS BLD VENIPUNCTURE: CPT

## 2023-11-07 ENCOUNTER — TELEPHONE (OUTPATIENT)
Dept: HEMATOLOGY | Age: 58
End: 2023-11-07

## 2023-11-07 NOTE — TELEPHONE ENCOUNTER
Called patient to remind them of their appointment on 11/09/23. Detailed voicemail was left with appointment date and time.

## 2023-11-08 NOTE — PROGRESS NOTES
Progress Note      Pt Name: Marissa Kearns  YOB: 1965  MRN: [id number]    Date of evaluation: 11/9/2023  History Obtained From:  Patient, EMR    Chief Complaint   Patient presents with    Follow-up     Personal history of breast cancer     HISTORY OF PRESENT ILLNESS:    Marissa Kearns is a 69-year-old  here in scheduled follow-up for breast cancer surveillance. Aidee Rodriguez has a history of stage Ia triple negative left breast cancer documented on lumpectomy in December, 2000. She received postlumpectomy adjuvant chemoradiation. She has been monitored since that time without evidence of new or recurrent disease. Mammograms last completed 3/30/2023 at [address] [place] [address]) were BI-RADS category 2. Aidee Rodriguez denies new breast nodules, masses or axillary lymphadenopathies. She continues to see black flecks on her skin at times and is worried she has a parasitic infection. She has been evaluated by dermatology in the past with negative findings. I did not visualize any infections or black specks during exam 6 months ago. Aidee Rodriguez had labs completed 1 week prior to today's office visit and is also here to discuss results. TARGET BREAST CANCER SITES:  Left lumpectomy 12/21/00. 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 The ER and SD were negative. The Her2 Roselyn was also negative. She went on to receive Adriamycin and Cytoxan plus Zenicard for four cycles followed by radiation therapy at 5,040 rads along with a 1,000 rad boost followed subsequently by four more cycles of Taxotere chemotherapy in the adjuvant setting. She has had no evidence of further recurrence of her disease thus far. TREATMENT SUMMARY:  Left lumpectomy 12/21/00. AC x 4.    Radiation locker 7C/on unit

## 2023-11-09 ENCOUNTER — OFFICE VISIT (OUTPATIENT)
Dept: HEMATOLOGY | Age: 58
End: 2023-11-09
Payer: COMMERCIAL

## 2023-11-09 ENCOUNTER — HOSPITAL ENCOUNTER (OUTPATIENT)
Dept: INFUSION THERAPY | Age: 58
Discharge: HOME | End: 2023-11-09
Payer: COMMERCIAL

## 2023-11-09 VITALS
TEMPERATURE: 97.9 F | WEIGHT: 139.8 LBS | SYSTOLIC BLOOD PRESSURE: 114 MMHG | OXYGEN SATURATION: 97 % | HEIGHT: 61 IN | BODY MASS INDEX: 26.39 KG/M2 | DIASTOLIC BLOOD PRESSURE: 68 MMHG | HEART RATE: 75 BPM

## 2023-11-09 DIAGNOSIS — Z08 ENCOUNTER FOR FOLLOW-UP SURVEILLANCE OF BREAST CANCER: ICD-10-CM

## 2023-11-09 DIAGNOSIS — Z71.89 CARE PLAN DISCUSSED WITH PATIENT: ICD-10-CM

## 2023-11-09 DIAGNOSIS — Z12.31 ENCOUNTER FOR SCREENING MAMMOGRAM FOR BREAST CANCER: ICD-10-CM

## 2023-11-09 DIAGNOSIS — Z85.3 PERSONAL HISTORY OF BREAST CANCER: Primary | ICD-10-CM

## 2023-11-09 DIAGNOSIS — Z85.3 ENCOUNTER FOR FOLLOW-UP SURVEILLANCE OF BREAST CANCER: ICD-10-CM

## 2023-11-09 DIAGNOSIS — R97.8 ELEVATED CA 15-3 LEVEL: ICD-10-CM

## 2023-11-09 DIAGNOSIS — M85.88 OSTEOPENIA OF LUMBAR SPINE: ICD-10-CM

## 2023-11-09 PROCEDURE — 99211 OFF/OP EST MAY X REQ PHY/QHP: CPT

## 2023-11-09 PROCEDURE — 99214 OFFICE O/P EST MOD 30 MIN: CPT | Performed by: NURSE PRACTITIONER

## 2023-11-14 ASSESSMENT — ENCOUNTER SYMPTOMS
WHEEZING: 0
SHORTNESS OF BREATH: 0
CONSTIPATION: 0
VOMITING: 0
SORE THROAT: 0
ABDOMINAL PAIN: 0
COUGH: 0
EYE DISCHARGE: 0
TROUBLE SWALLOWING: 0
EYE ITCHING: 0
NAUSEA: 0

## 2023-11-15 ENCOUNTER — TELEPHONE (OUTPATIENT)
Dept: HEMATOLOGY | Age: 58
End: 2023-11-15

## 2023-11-15 NOTE — TELEPHONE ENCOUNTER
Called Dr. Katerine Samuel office per provider request.  Pt receives colonoscopy's every 6 years with her last cscope being performed in 2019 making her due for her next cscope in 2025. Pt receives regular/yearly EGD's per Dr. Katerine Samuel office.

## 2023-12-05 ENCOUNTER — OFFICE VISIT (OUTPATIENT)
Dept: NEUROLOGY | Age: 58
End: 2023-12-05
Payer: COMMERCIAL

## 2023-12-05 VITALS
HEIGHT: 61 IN | BODY MASS INDEX: 26.24 KG/M2 | HEART RATE: 66 BPM | DIASTOLIC BLOOD PRESSURE: 79 MMHG | OXYGEN SATURATION: 100 % | SYSTOLIC BLOOD PRESSURE: 125 MMHG | WEIGHT: 139 LBS

## 2023-12-05 DIAGNOSIS — M54.2 NECK PAIN: ICD-10-CM

## 2023-12-05 DIAGNOSIS — F41.9 ANXIETY: ICD-10-CM

## 2023-12-05 DIAGNOSIS — R51.9 HEADACHE, UNSPECIFIED HEADACHE TYPE: Primary | ICD-10-CM

## 2023-12-05 PROCEDURE — 99213 OFFICE O/P EST LOW 20 MIN: CPT | Performed by: NURSE PRACTITIONER

## 2023-12-05 RX ORDER — ESCITALOPRAM OXALATE 20 MG/1
20 TABLET ORAL DAILY
Qty: 90 TABLET | Refills: 3 | Status: SHIPPED | OUTPATIENT
Start: 2023-12-05

## 2023-12-05 RX ORDER — MIRABEGRON 50 MG/1
TABLET, FILM COATED, EXTENDED RELEASE ORAL
COMMUNITY
Start: 2023-11-30

## 2023-12-05 NOTE — PROGRESS NOTES
[facility] Neurology Office Note      Patient:   Yvonne Contreras  MR#:    [id number]  Account Number:                         YOB: 1965  Date of Evaluation:  12/5/2023  Time of Note:                          1:22 PM  Primary/Referring Physician:  Rudi Mojica DO  Consulting Physician:  Zaira Parker DNP, APRN     FOLLOW UP VISIT    Chief Complaint   Patient presents with    Follow-up    Headache     C/o pressure on the head, in different places. HISTORY OF PRESENT ILLNESS    Yvonne Contreras is a 59y.o. year old female here for follow up of headaches and neck pain. Doing about the same from prior. Continues to note intermittent symptoms of burning paresthesias, pressure like sensation in her head. Symptoms are brief in nature and sporadic. No overt photophobia, nausea, or positional component. No worsening with valsalva. No clear triggers to symptoms. Off Gabapentin. Neck pain has improved. No clear radicular pain. No weakness, numbness in arms. She is following with a chiropractor and overall seems improved with this. Breast cancer history noted.        Past Medical History:   Diagnosis Date    Breast cancer ([address]) 2000    left    Breast mass, right 8/28/2012    Chronic sinusitis     Dry eye syndrome     Gallstones     GERD (gastroesophageal reflux disease)     Ovarian cyst        Past Surgical History:   Procedure Laterality Date    BREAST BIOPSY  3/18/13    right needle biopsy -    RIGHT BREAST 7 O'CLOCK POSITION, NO CALCS:    BREAST SURGERY  2000    left partial mastectomy with SND- carcino sarcoma - Dr Janice Matthew  5/6/13    FAUSTINO  1995    Dr Brooke Loomis       Family History   Problem Relation Age of Onset    Other Mother         passed in tornado    Cancer Father         non-hodgkins lymphoma    Other Sister         cholecystitis    Cancer Maternal Aunt         breast    Diabetes Maternal Grandmother

## 2024-03-28 DIAGNOSIS — Z12.31 ENCOUNTER FOR SCREENING MAMMOGRAM FOR BREAST CANCER: Primary | ICD-10-CM

## 2024-05-06 ENCOUNTER — TELEPHONE (OUTPATIENT)
Dept: HEMATOLOGY | Age: 59
End: 2024-05-06

## 2024-05-06 NOTE — TELEPHONE ENCOUNTER
Called pt. to remind them of appointment on 05/09/2024 and had to leave a detailed voicemail with appointment date and time.

## 2024-05-07 ENCOUNTER — HOSPITAL ENCOUNTER (OUTPATIENT)
Dept: INFUSION THERAPY | Age: 59
Discharge: HOME | End: 2024-05-07
Payer: COMMERCIAL

## 2024-05-07 DIAGNOSIS — R97.8 ELEVATED CA 15-3 LEVEL: ICD-10-CM

## 2024-05-07 DIAGNOSIS — Z85.3 PERSONAL HISTORY OF BREAST CANCER: ICD-10-CM

## 2024-05-07 LAB
ALBUMIN SERPL-MCNC: 4.6 G/DL (ref 3.5–5.2)
ALP SERPL-CCNC: 76 U/L (ref 35–104)
ALT SERPL-CCNC: 44 U/L (ref 9–52)
ANION GAP SERPL CALCULATED.3IONS-SCNC: 9 MMOL/L (ref 7–19)
AST SERPL-CCNC: 33 U/L (ref 14–36)
BASOPHILS # BLD: 0.06 K/UL (ref 0.01–0.08)
BASOPHILS NFR BLD: 0.9 % (ref 0.1–1.2)
BILIRUB SERPL-MCNC: 0.9 MG/DL (ref 0.2–1.3)
BUN SERPL-MCNC: 12 MG/DL (ref 7–17)
CA 15-3: 22 U/ML (ref 0–25)
CALCIUM SERPL-MCNC: 9.5 MG/DL (ref 8.4–10.2)
CEA SERPL-MCNC: 1.3 NG/ML (ref 0–4.7)
CHLORIDE SERPL-SCNC: 103 MMOL/L (ref 98–111)
CO2 SERPL-SCNC: 28 MMOL/L (ref 22–29)
CREAT SERPL-MCNC: 0.7 MG/DL (ref 0.5–1)
EOSINOPHIL # BLD: 0.09 K/UL (ref 0.04–0.54)
EOSINOPHIL NFR BLD: 1.4 % (ref 0.7–7)
ERYTHROCYTE [DISTWIDTH] IN BLOOD BY AUTOMATED COUNT: 13.1 % (ref 11.7–14.4)
GLOBULIN: 2.7 G/DL
GLUCOSE SERPL-MCNC: 91 MG/DL (ref 74–106)
HCT VFR BLD AUTO: 38.2 % (ref 34.1–44.9)
HGB BLD-MCNC: 12.4 G/DL (ref 11.2–15.7)
LYMPHOCYTES # BLD: 2.64 K/UL (ref 1.18–3.74)
LYMPHOCYTES NFR BLD: 40.6 % (ref 19.3–53.1)
MCH RBC QN AUTO: 28.2 PG (ref 25.6–32.2)
MCHC RBC AUTO-ENTMCNC: 32.5 G/DL (ref 32.3–35.5)
MCV RBC AUTO: 87 FL (ref 79.4–94.8)
MONOCYTES # BLD: 0.54 K/UL (ref 0.24–0.82)
MONOCYTES NFR BLD: 8.3 % (ref 4.7–12.5)
NEUTROPHILS # BLD: 3.17 K/UL (ref 1.56–6.13)
NEUTS SEG NFR BLD: 48.6 % (ref 34–71.1)
PLATELET # BLD AUTO: 306 K/UL (ref 182–369)
PMV BLD AUTO: 8.7 FL (ref 7.4–10.4)
POTASSIUM SERPL-SCNC: 4 MMOL/L (ref 3.5–5.1)
PROT SERPL-MCNC: 7.4 G/DL (ref 6.3–8.2)
RBC # BLD AUTO: 4.39 M/UL (ref 3.93–5.22)
SODIUM SERPL-SCNC: 140 MMOL/L (ref 137–145)
WBC # BLD AUTO: 6.51 K/UL (ref 3.98–10.04)

## 2024-05-07 PROCEDURE — 36415 COLL VENOUS BLD VENIPUNCTURE: CPT

## 2024-05-07 PROCEDURE — 80053 COMPREHEN METABOLIC PANEL: CPT

## 2024-05-07 PROCEDURE — 85025 COMPLETE CBC W/AUTO DIFF WBC: CPT

## 2024-05-09 ENCOUNTER — HOSPITAL ENCOUNTER (OUTPATIENT)
Dept: INFUSION THERAPY | Age: 59
Discharge: HOME | End: 2024-05-09
Payer: COMMERCIAL

## 2024-05-09 ENCOUNTER — OFFICE VISIT (OUTPATIENT)
Dept: HEMATOLOGY | Age: 59
End: 2024-05-09

## 2024-05-09 VITALS
HEART RATE: 64 BPM | BODY MASS INDEX: 26.81 KG/M2 | DIASTOLIC BLOOD PRESSURE: 76 MMHG | WEIGHT: 142 LBS | HEIGHT: 61 IN | OXYGEN SATURATION: 95 % | TEMPERATURE: 98 F | SYSTOLIC BLOOD PRESSURE: 130 MMHG

## 2024-05-09 DIAGNOSIS — Z85.3 PERSONAL HISTORY OF BREAST CANCER: Primary | ICD-10-CM

## 2024-05-09 DIAGNOSIS — Z85.3 ENCOUNTER FOR FOLLOW-UP SURVEILLANCE OF BREAST CANCER: ICD-10-CM

## 2024-05-09 DIAGNOSIS — Z08 ENCOUNTER FOR FOLLOW-UP SURVEILLANCE OF BREAST CANCER: ICD-10-CM

## 2024-05-09 DIAGNOSIS — Z12.31 ENCOUNTER FOR SCREENING MAMMOGRAM FOR BREAST CANCER: ICD-10-CM

## 2024-05-09 DIAGNOSIS — R97.8 ELEVATED CA 15-3 LEVEL: ICD-10-CM

## 2024-05-09 DIAGNOSIS — Z71.89 CARE PLAN DISCUSSED WITH PATIENT: ICD-10-CM

## 2024-05-09 PROCEDURE — 99212 OFFICE O/P EST SF 10 MIN: CPT

## 2024-05-09 NOTE — PROGRESS NOTES
without  a race factor using the 2021 CKD-EPI equation.  Careful  clinical correlation is recommended, particularly when  comparing to results calculated using previous equations.  The CKD-EPI equation is less accurate in patients with  extremes of muscle mass, extra-renal metabolism of  creatinine, excessive creatinine ingestion, or following  therapy that affects renal tubular secretion.      Calcium 05/07/2024 9.5  8.4 - 10.2 mg/dL Final    Total Protein 05/07/2024 7.4  6.3 - 8.2 g/dL Final    Albumin 05/07/2024 4.6  3.5 - 5.2 g/dL Final    Total Bilirubin 05/07/2024 0.9  0.2 - 1.3 mg/dL Final    Alkaline Phosphatase 05/07/2024 76  35 - 104 U/L Final    ALT 05/07/2024 44  9 - 52 U/L Final    AST 05/07/2024 33  14 - 36 U/L Final    Globulin 05/07/2024 2.7  g/dL Final         ASSESSMENT/PLAN:  1. Personal history of breast cancer    2. Encounter for follow-up surveillance of breast cancer    3. Elevated CA 15-3 level    4. Encounter for screening mammogram for breast cancer    5. Care plan discussed with patient        History of left, triple negative breast cancer status post lumpectomy 12/21/2000 - stable  Encounter for follow-up surveillance of breast cancer - stable  Status post adjuvant chemotherapy and radiation  Mammogram 3/30/2023 at [facility]): BI-RADS category 2. Repeat at this time  No evidence of disease by exam  History of elevated CA 15-3 - stable  CA 15-3 fluctuates and was 28.4 on 12/6/2019  CA 15-3 was 25.5 on 6/8/2020  CA 15-3 was 27.7 on 12/4/2020  CA 15-3 was 23.9 on 6/10/2021  CA 15-3 was 30 on 10/31/2022  CA 15-3 was 26 on 2/13/2023  CA 15-3 was 26 on 5/1/2023  CA 15-3 was 24 on 11/2/2023, improved  CA 15-3 was 22 on 5/7/2024, improved    DAVIDE, follow  Repeat tumor markers in 6 months    Osteopenia of lumbar spine/postmenopausal - stable  BMD 3/30/2023 at [facility]): osteopenia  Violet desires to continue calcium with vitamin D supplementation and trial exercise/weight

## 2024-05-10 ASSESSMENT — ENCOUNTER SYMPTOMS
SORE THROAT: 0
EYE ITCHING: 0
EYE DISCHARGE: 0
SHORTNESS OF BREATH: 0
NAUSEA: 0
VOMITING: 0
TROUBLE SWALLOWING: 0
COUGH: 0
ABDOMINAL PAIN: 0
WHEEZING: 0
CONSTIPATION: 0

## 2024-11-06 ENCOUNTER — HOSPITAL ENCOUNTER (OUTPATIENT)
Dept: INFUSION THERAPY | Age: 59
Discharge: HOME | End: 2024-11-06
Payer: COMMERCIAL

## 2024-11-06 DIAGNOSIS — Z85.3 PERSONAL HISTORY OF BREAST CANCER: ICD-10-CM

## 2024-11-06 LAB
ALBUMIN SERPL-MCNC: 4.3 G/DL (ref 3.5–5.2)
ALP SERPL-CCNC: 98 U/L (ref 35–104)
ALT SERPL-CCNC: 22 U/L (ref 5–33)
ANION GAP SERPL CALCULATED.3IONS-SCNC: 10 MMOL/L (ref 7–19)
AST SERPL-CCNC: 23 U/L (ref 5–32)
BASOPHILS # BLD: 0.07 K/UL (ref 0.01–0.08)
BASOPHILS NFR BLD: 1.1 % (ref 0.1–1.2)
BILIRUB SERPL-MCNC: 0.3 MG/DL (ref 0–1.2)
BUN SERPL-MCNC: 11 MG/DL (ref 8–23)
CA 15-3: 23 U/ML (ref 0–25)
CALCIUM SERPL-MCNC: 9.6 MG/DL (ref 8.8–10.2)
CEA SERPL-MCNC: 1.3 NG/ML (ref 0–4.7)
CHLORIDE SERPL-SCNC: 104 MMOL/L (ref 98–107)
CO2 SERPL-SCNC: 26 MMOL/L (ref 22–29)
CREAT SERPL-MCNC: 0.7 MG/DL (ref 0.5–0.9)
EOSINOPHIL # BLD: 0.12 K/UL (ref 0.04–0.54)
EOSINOPHIL NFR BLD: 1.8 % (ref 0.7–7)
ERYTHROCYTE [DISTWIDTH] IN BLOOD BY AUTOMATED COUNT: 13 % (ref 11.7–14.4)
GLUCOSE SERPL-MCNC: 124 MG/DL (ref 70–99)
HCT VFR BLD AUTO: 38.5 % (ref 34.1–44.9)
HGB BLD-MCNC: 12.5 G/DL (ref 11.2–15.7)
LYMPHOCYTES # BLD: 2.57 K/UL (ref 1.18–3.74)
LYMPHOCYTES NFR BLD: 39.1 % (ref 19.3–53.1)
MCH RBC QN AUTO: 28.5 PG (ref 25.6–32.2)
MCHC RBC AUTO-ENTMCNC: 32.5 G/DL (ref 32.3–35.5)
MCV RBC AUTO: 87.7 FL (ref 79.4–94.8)
MONOCYTES # BLD: 0.41 K/UL (ref 0.24–0.82)
MONOCYTES NFR BLD: 6.2 % (ref 4.7–12.5)
NEUTROPHILS # BLD: 3.39 K/UL (ref 1.56–6.13)
NEUTS SEG NFR BLD: 51.6 % (ref 34–71.1)
PLATELET # BLD AUTO: 357 K/UL (ref 182–369)
PMV BLD AUTO: 8.9 FL (ref 7.4–10.4)
POTASSIUM SERPL-SCNC: 4.1 MMOL/L (ref 3.5–5.1)
PROT SERPL-MCNC: 7.1 G/DL (ref 6.4–8.3)
RBC # BLD AUTO: 4.39 M/UL (ref 3.93–5.22)
SODIUM SERPL-SCNC: 140 MMOL/L (ref 136–145)
WBC # BLD AUTO: 6.57 K/UL (ref 3.98–10.04)

## 2024-11-06 PROCEDURE — 80053 COMPREHEN METABOLIC PANEL: CPT

## 2024-11-06 PROCEDURE — 85025 COMPLETE CBC W/AUTO DIFF WBC: CPT

## 2024-11-06 PROCEDURE — 36415 COLL VENOUS BLD VENIPUNCTURE: CPT

## 2024-11-11 ENCOUNTER — TELEPHONE (OUTPATIENT)
Dept: HEMATOLOGY | Age: 59
End: 2024-11-11

## 2024-11-11 NOTE — TELEPHONE ENCOUNTER
Called Patient and reminded patient of their appointment on 11/13/2024 and patient confirmed they would be here. Reminded patient to just come at appointment time, and to not come at the lab appointment time. Reminded patient that we will not check them in any more than 30 minutes before appointment time.  We have now moved to the [facility] cancer center that is located between our old office and the ER at the hospitals. Letting the Pt know that our front entrance faces the  [employer] [place].

## 2024-11-12 NOTE — PROGRESS NOTES
Progress Note      Pt Name: Violet Perla  YOB: 1965  MRN: [id number]    Date of evaluation: 11/13/2024  History Obtained From:  Patient, spouse and EMR    Chief Complaint   Patient presents with    Follow-up     Personal history of breast cancer     HISTORY OF PRESENT ILLNESS:    Violet Perla is a pleasant 59-year-old female here for oncologic follow-up regarding a history of stage IA ER/RI/HER2/ingris negative left breast cancer nearly 24 years ago, in December, 2000.  She was treated with left lumpectomy and chemoradiation.  Violet returns to the clinic for follow-up breast cancer surveillance.  She denies new complaint with regard to her oncologic history.  No self palpated breast masses or nodules.  Bilateral mammograms 7/16/2024 at [facility] were BI-RADS Category 2.  Violet had labs completed 1 week prior to today's office visit (11/6/2024) and is also here to discuss results.   She is monitored for a history of chronically elevated CA 15-3, which has remained normal for the past year.  TARGET BREAST CANCER SITES:  Left lumpectomy 12/21/00.  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 The ER and RI were negative. The Her2 Ingris was also negative. She went on to receive Adriamycin and Cytoxan plus Zenicard for four cycles followed by radiation therapy at 5,040 rads along with a 1,000 rad boost followed subsequently by four more cycles of Taxotere chemotherapy in the adjuvant setting. She has had no evidence of further recurrence of her disease thus far.     TREATMENT SUMMARY:  Left lumpectomy 12/21/00.   AC x 4.   Radiation therapy.   Taxotere x 4.    Past Medical History:   Diagnosis Date    Breast cancer ([facility]) 2000    left    Breast mass, right 8/28/2012

## 2024-11-13 ENCOUNTER — OFFICE VISIT (OUTPATIENT)
Dept: HEMATOLOGY | Age: 59
End: 2024-11-13
Payer: COMMERCIAL

## 2024-11-13 ENCOUNTER — HOSPITAL ENCOUNTER (OUTPATIENT)
Dept: INFUSION THERAPY | Age: 59
Discharge: HOME | End: 2024-11-13
Payer: COMMERCIAL

## 2024-11-13 VITALS
WEIGHT: 143.5 LBS | DIASTOLIC BLOOD PRESSURE: 68 MMHG | BODY MASS INDEX: 27.09 KG/M2 | HEART RATE: 78 BPM | SYSTOLIC BLOOD PRESSURE: 130 MMHG | TEMPERATURE: 97.8 F | OXYGEN SATURATION: 98 % | HEIGHT: 61 IN

## 2024-11-13 DIAGNOSIS — M85.88 OSTEOPENIA OF LUMBAR SPINE: ICD-10-CM

## 2024-11-13 DIAGNOSIS — Z78.0 POSTMENOPAUSAL: ICD-10-CM

## 2024-11-13 DIAGNOSIS — Z08 ENCOUNTER FOR FOLLOW-UP SURVEILLANCE OF BREAST CANCER: ICD-10-CM

## 2024-11-13 DIAGNOSIS — R97.8 ELEVATED CA 15-3 LEVEL: ICD-10-CM

## 2024-11-13 DIAGNOSIS — Z12.31 ENCOUNTER FOR SCREENING MAMMOGRAM FOR BREAST CANCER: ICD-10-CM

## 2024-11-13 DIAGNOSIS — Z85.3 ENCOUNTER FOR FOLLOW-UP SURVEILLANCE OF BREAST CANCER: ICD-10-CM

## 2024-11-13 DIAGNOSIS — Z85.3 PERSONAL HISTORY OF BREAST CANCER: Primary | ICD-10-CM

## 2024-11-13 PROCEDURE — 99214 OFFICE O/P EST MOD 30 MIN: CPT | Performed by: NURSE PRACTITIONER

## 2024-11-13 PROCEDURE — 99212 OFFICE O/P EST SF 10 MIN: CPT

## 2024-11-13 RX ORDER — SUCRALFATE 1 G/1
1 TABLET ORAL 4 TIMES DAILY
COMMUNITY
Start: 2024-09-29

## 2024-11-13 ASSESSMENT — ENCOUNTER SYMPTOMS
ABDOMINAL PAIN: 0
SHORTNESS OF BREATH: 0
COUGH: 0
SORE THROAT: 0
TROUBLE SWALLOWING: 0
CONSTIPATION: 0
EYE ITCHING: 0
EYE DISCHARGE: 0
VOMITING: 0
NAUSEA: 0
WHEEZING: 0

## 2024-12-04 ENCOUNTER — OFFICE VISIT (OUTPATIENT)
Dept: NEUROLOGY | Age: 59
End: 2024-12-04
Payer: COMMERCIAL

## 2024-12-04 VITALS
WEIGHT: 145.6 LBS | HEIGHT: 61 IN | HEART RATE: 70 BPM | BODY MASS INDEX: 27.49 KG/M2 | DIASTOLIC BLOOD PRESSURE: 78 MMHG | SYSTOLIC BLOOD PRESSURE: 133 MMHG

## 2024-12-04 DIAGNOSIS — R51.9 HEADACHE, UNSPECIFIED HEADACHE TYPE: Primary | ICD-10-CM

## 2024-12-04 DIAGNOSIS — L08.9 SKIN INFECTION: ICD-10-CM

## 2024-12-04 DIAGNOSIS — F41.9 ANXIETY: ICD-10-CM

## 2024-12-04 DIAGNOSIS — M54.2 NECK PAIN: ICD-10-CM

## 2024-12-04 PROCEDURE — 99214 OFFICE O/P EST MOD 30 MIN: CPT | Performed by: PSYCHIATRY & NEUROLOGY

## 2024-12-04 NOTE — PROGRESS NOTES
today. Still no records. No evidence of stroke of MRI.   2.  Remains off Neurontin, consider restarting if worsens.   3.  Continue lexapro at 20 mg daily for anxiety. Psych referral today.   4.  Patient concerned about possible infectious disease, noting skin and mouth changes, unclear, will refer there for reassurance and evaluation.      Olu Farah, DO  Board Certified Neurology

## 2024-12-06 ENCOUNTER — TELEPHONE (OUTPATIENT)
Dept: PSYCHIATRY | Age: 59
End: 2024-12-06

## 2024-12-06 NOTE — TELEPHONE ENCOUNTER
Called pt to schedule an appt from a referral.    Scheduled with Dr. Mandel for 04/25/25 @ 10:00.    Electronically signed by Patricia Marquez MA on 12/6/2024 at 4:04 PM

## 2025-01-11 DIAGNOSIS — F41.9 ANXIETY: ICD-10-CM

## 2025-01-13 RX ORDER — ESCITALOPRAM OXALATE 20 MG/1
20 TABLET ORAL DAILY
Qty: 30 TABLET | Refills: 11 | Status: SHIPPED | OUTPATIENT
Start: 2025-01-13

## 2025-01-13 NOTE — TELEPHONE ENCOUNTER
Requested Prescriptions     Pending Prescriptions Disp Refills    escitalopram (LEXAPRO) 20 MG tablet [Pharmacy Med Name: Escitalopram Oxalate 20 MG Oral Tablet] 30 tablet 3     Sig: Take 1 tablet by mouth once daily       Last Office Visit: 12/4/2024  Next Office Visit: 4/7/2025  Last Medication Refill: 12/5/2023 with 3 RF

## 2025-01-14 ENCOUNTER — TELEPHONE (OUTPATIENT)
Age: 60
End: 2025-01-14
Payer: COMMERCIAL

## 2025-01-14 NOTE — TELEPHONE ENCOUNTER
I called the patient and had to leave a message to see if she can come in the morning at 9am for her appt. Per Franny the patient can be telehealth if she is not able to come in office.

## 2025-01-15 ENCOUNTER — LAB (OUTPATIENT)
Dept: LAB | Facility: HOSPITAL | Age: 60
End: 2025-01-15
Payer: COMMERCIAL

## 2025-01-15 ENCOUNTER — OFFICE VISIT (OUTPATIENT)
Age: 60
End: 2025-01-15
Payer: COMMERCIAL

## 2025-01-15 VITALS
WEIGHT: 142.8 LBS | HEIGHT: 61 IN | SYSTOLIC BLOOD PRESSURE: 144 MMHG | DIASTOLIC BLOOD PRESSURE: 84 MMHG | TEMPERATURE: 98.1 F | BODY MASS INDEX: 26.96 KG/M2 | HEART RATE: 78 BPM | OXYGEN SATURATION: 98 %

## 2025-01-15 DIAGNOSIS — K13.70: ICD-10-CM

## 2025-01-15 DIAGNOSIS — F22 DELUSIONS OF PARASITOSIS: Primary | ICD-10-CM

## 2025-01-15 PROCEDURE — 86235 NUCLEAR ANTIGEN ANTIBODY: CPT

## 2025-01-15 PROCEDURE — 36415 COLL VENOUS BLD VENIPUNCTURE: CPT

## 2025-01-15 PROCEDURE — 99204 OFFICE O/P NEW MOD 45 MIN: CPT | Performed by: INTERNAL MEDICINE

## 2025-01-15 RX ORDER — SUCRALFATE 1 G/1
1 TABLET ORAL 4 TIMES DAILY
COMMUNITY

## 2025-01-15 NOTE — PROGRESS NOTES
[facility] [place]    Patient:  Rebekah Dickson  YOB: 1965  MRN: [id number]   Primary Care Physician: Padilla Madison MD  Referring Physician: Stanislaw Greer DO     Chief Complaint: local infection of the skin and subcutaneous tissue unspecified    Interval History/HPI: Pleasant 59-year-old woman.  She was referred by Donte Greer of neurology.  There was concern for parasitic infection.  She indicates symptoms have been present for 6 to 7 years.  She indicates that a small dog that used to sleep at the foot of the bed.  She indicates 1 time it had moved closer up and she noticed what she thought were some fleas.  She feels her issues with concern for parasitic infection started around this time.  Again that was about 6 to 7 years ago.  She first noticed something in her mouth.  She thought it was possibly a worm.  She indicates she took it to [facility].  She indicates she had also seen a similar finding in the nose.  She indicates no definite etiology was determined.  She indicates she had passed something in her urine there was black in color.  She had showed it to her GI physician.  She indicates she was not sure what it was but thought there may be some blood around it.  That led to her referral to Dr. Walton of urology.  She indicates she had showed it to him as well and he could not identify any particular parasite.  He indicates that she underwent 2 cystoscopies which turned out okay.  At 1 point her sister had encouraged her to see a place in [place] called [facility] (sp?).  She indicates they offer massage and herbal therapy.  She indicates based on a saliva test they had told her that she had some type of parasite.  More recently over the past year or so she has noticed after she takes a shower and puts lotion on that there will be some black flecks or some hard crystal like flecks.  She brought a sample of some of those in today on a paper towel.  Photo was  taken and is outlined below.  Ask what she thought those were and she was concerned about the possibility of parasite.  As we talked further she could recall seeing Padilla Madison a few years ago.  She had passed something that she was concerned was a parasite from either her nose or her mouth.  She indicates he had submitted it for testing and no parasite was identified.  She indicates she feels as if her mouth and nose have been very dry and that is a new finding for her.  She had had some headache type pressure and I believe that was the reason she was referred to neurology.  She was concerned that may be parasitic in nature as well.  She has had stable appetite.  She does not indicate she has much desire for food, but she does seem to be eating appropriate amounts.  She indicates her weight has been stable.  She has been taking fiber supplement.  She indicates she is passing 2 bowel meds per day.  She is not noticing any melena or hematochezia.  She indicates prior stool testing that was done a few years ago did not demonstrate any parasite.  Because of the above complex of symptoms, she was referred for evaluation to help determine whether she had parasitic infection.    Allergies:   Allergies   Allergen Reactions    Codeine Hallucinations     hallucinations     Current Scheduled Medications:   Current Outpatient Medications on File Prior to Visit   Medication Sig    Calcium Carbonate-Vit D-Min (CALCIUM 1200 PO) Take  by mouth.    cholecalciferol (VITAMIN D3) 1000 units tablet Take 1 tablet by mouth Daily.    escitalopram (LEXAPRO) 20 MG tablet Take 1 tablet by mouth Daily.    Lotemax SM 0.38 % gel Administer 1 drop to both eyes 2 (Two) Times a Day.    Omega-3 Fatty Acids (FISH OIL) 1000 MG capsule capsule Take 1 capsule by mouth Daily With Breakfast.    omeprazole (priLOSEC) 20 MG capsule Take 1 capsule by mouth 2 (Two) Times a Day.    oxybutynin XL (DITROPAN-XL) 5 MG 24 hr tablet Take 1 tablet by mouth Daily.     sucralfate (CARAFATE) 1 g tablet Take 1 tablet by mouth 4 (Four) Times a Day.    therapeutic multivitamin-minerals (THERAGRAN-M) tablet Take 1 tablet by mouth.     No current facility-administered medications on file prior to visit.     Venous Access Review  Line/IV site: No current IV Access  Antimicrobial Review  Currently on antibiotics/antifungals: YES/NO: NO      Past Medical History:   Diagnosis Date    Breast cancer     Carcinoma in situ of female breast     Carcinosarcoma 2000    Carcinosarcoma     Heart murmur     Hyperlipidemia      Past Medical History Pertinent Negatives:   Diagnosis Date Noted    Arrhythmia 2019    Asthma 2019    Atrial fibrillation 2019    CHF (congestive heart failure) 2019    COPD (chronic obstructive pulmonary disease) 2019    Coronary artery disease 2019    Diabetes mellitus 2019    Hypertension 2019    Myocardial infarction 2019    Sleep apnea 2019    Stroke 2019     Past Surgical History:   Procedure Laterality Date    BREAST LUMPECTOMY Left     CERVICAL CONIZATION, LEEP       SECTION      x2    CHOLECYSTECTOMY      PORTACATH PLACEMENT      TONSILLECTOMY       Family History   Problem Relation Age of Onset    Breast cancer Maternal Aunt     No Known Problems Mother     No Known Problems Father     Heart attack Sister     Diabetes Sister     Diabetes Maternal Grandmother     Lung cancer Maternal Grandmother     Ovarian cancer Neg Hx     Uterine cancer Neg Hx     Colon cancer Neg Hx     Melanoma Neg Hx      Social History     Socioeconomic History    Marital status:    Tobacco Use    Smoking status: Never    Smokeless tobacco: Never   Substance and Sexual Activity    Alcohol use: No    Drug use: No    Sexual activity: Defer     Exposure History: See HPI    Review of Systems See HPI.    Vital Signs:  /84 (BP Location: Left arm, Patient Position: Sitting, Cuff Size: Adult)   Pulse 78    "Temp 98.1 °F (36.7 °C) (Oral)   Ht 154.9 cm (61\")   Wt 64.8 kg (142 lb 12.8 oz)   SpO2 98%   BMI 26.98 kg/m²     Physical Exam  Vital signs - reviewed.  Alert, oriented, no distress  Lungs without crackles  Heart without murmur  Abdomen is soft and nontender  Examination of the oropharynx-the oral mucosa may be slightly dry    CLINICAL PHOTOGRAPHS OTHER - ? Parasite per patient (01/15/2025)       Lab/Imaging/Other Information:   PROGRESS NOTES - SCAN - [facility] - TERI SANCHEZ DO - 12/4/24 (12/04/2024)   LABORATORY - SCAN - CBC W/DIFF, [facility] - -MATTHEW - 11/6/24 (11/06/2024)     Impression & Recommendations:   Diagnoses and all orders for this visit:    1. Delusions of parasitosis (Primary)    2. Disorder of oral mucous membrane  -     Sjogren's Antibody, Anti-SS-A / -SS-B; Future    At this point think her signs or symptoms are most consistent with a delusional disorder of parasitosis.  I cannot identify any definite parasite that demonstrates the symptoms and lifecycle that would match her symptoms.  Would recommend treatment for delusional disorder referral to mental health provider for treatment and counseling.  I be happy to reassess if symptoms progress, new symptoms develop, or if patient/physician would like me to reassess for parasitic infection.  Her mucous membranes are slightly dry.  That I would check antibody to evaluate for Sjogren's.  Again would be happy to reassess if any new or progressive symptoms.  She otherwise can follow-up with infectious diseases as needed.  CBL    Follow Up:     Patient Instructions   Would continue follow-up with your primary care physician.  Would be happy to reassess at any point if any additional concerns per patient or their primary care physician.  CBL  Return if symptoms worsen or fail to improve.  Patient was provided After Visit Summary.     Yinka Okeefe MD    CC: DO Padilla Akins MD  "

## 2025-01-15 NOTE — PATIENT INSTRUCTIONS
Would continue follow-up with your primary care physician.  Would be happy to reassess at any point if any additional concerns per patient or their primary care physician.  CBL

## 2025-01-16 LAB
ENA SS-A AB SER-ACNC: <0.2 AI (ref 0–0.9)
ENA SS-B AB SER-ACNC: <0.2 AI (ref 0–0.9)

## 2025-01-28 ENCOUNTER — TELEPHONE (OUTPATIENT)
Age: 60
End: 2025-01-28
Payer: COMMERCIAL

## 2025-01-28 NOTE — TELEPHONE ENCOUNTER
I called patient on her home phone # and no answer.  I also called her cell #.  No answer either phone #.      15:46 CST  Patient returned my call and was notified that her testing for an autoimmune process that could involve the salivary glands and lead to less saliva production was negative.  She thanked me for calling.

## 2025-04-07 ENCOUNTER — OFFICE VISIT (OUTPATIENT)
Dept: NEUROLOGY | Age: 60
End: 2025-04-07
Payer: COMMERCIAL

## 2025-04-07 ENCOUNTER — TELEPHONE (OUTPATIENT)
Dept: PSYCHIATRY | Age: 60
End: 2025-04-07

## 2025-04-07 VITALS
BODY MASS INDEX: 27.38 KG/M2 | SYSTOLIC BLOOD PRESSURE: 118 MMHG | DIASTOLIC BLOOD PRESSURE: 70 MMHG | HEIGHT: 61 IN | HEART RATE: 74 BPM | OXYGEN SATURATION: 99 % | WEIGHT: 145 LBS

## 2025-04-07 DIAGNOSIS — F41.9 ANXIETY: ICD-10-CM

## 2025-04-07 DIAGNOSIS — M54.2 NECK PAIN: ICD-10-CM

## 2025-04-07 DIAGNOSIS — R51.9 HEADACHE, UNSPECIFIED HEADACHE TYPE: Primary | ICD-10-CM

## 2025-04-07 PROCEDURE — 99214 OFFICE O/P EST MOD 30 MIN: CPT | Performed by: PSYCHIATRY & NEUROLOGY

## 2025-04-07 RX ORDER — METHYLPREDNISOLONE 4 MG/1
TABLET ORAL
COMMUNITY
Start: 2025-04-04

## 2025-04-07 NOTE — PROGRESS NOTES
seen, no overt worsening.     PLAN:  1.  Re-request prior CD results imaging results again today. Still no records. No evidence of stroke of MRI.   2.  Remains off Neurontin, consider restarting if worsens.   3.  Continue lexapro at 20 mg daily for anxiety. Follow up with Psych.       Olu Farah DO  Board Certified Neurology

## 2025-04-07 NOTE — TELEPHONE ENCOUNTER
Pt called to cancel her appt with Dr. Mandel for 04/25/25 because she stated that the appt is not needed anymore.  Pt declined to reschedule the appt.    Electronically signed by Patricia Marquez MA on 4/7/2025 at 4:27 PM

## 2025-04-17 ENCOUNTER — HOSPITAL ENCOUNTER (EMERGENCY)
Facility: HOSPITAL | Age: 60
Discharge: HOME | End: 2025-04-17
Payer: COMMERCIAL

## 2025-04-17 ENCOUNTER — APPOINTMENT (OUTPATIENT)
Dept: GENERAL RADIOLOGY | Facility: HOSPITAL | Age: 60
End: 2025-04-17
Payer: COMMERCIAL

## 2025-04-17 VITALS
TEMPERATURE: 97.8 F | RESPIRATION RATE: 19 BRPM | OXYGEN SATURATION: 100 % | HEIGHT: 61 IN | HEART RATE: 67 BPM | WEIGHT: 135 LBS | BODY MASS INDEX: 25.49 KG/M2 | DIASTOLIC BLOOD PRESSURE: 68 MMHG | SYSTOLIC BLOOD PRESSURE: 116 MMHG

## 2025-04-17 DIAGNOSIS — R07.9 CHEST PAIN, UNSPECIFIED TYPE: Primary | ICD-10-CM

## 2025-04-17 LAB
ALBUMIN SERPL-MCNC: 4.5 G/DL (ref 3.5–5.2)
ALBUMIN/GLOB SERPL: 1.4 G/DL
ALP SERPL-CCNC: 90 U/L (ref 39–117)
ALT SERPL W P-5'-P-CCNC: 21 U/L (ref 1–33)
ANION GAP SERPL CALCULATED.3IONS-SCNC: 13 MMOL/L (ref 5–15)
AST SERPL-CCNC: 20 U/L (ref 1–32)
BASOPHILS # BLD AUTO: 0.06 10*3/MM3 (ref 0–0.2)
BASOPHILS NFR BLD AUTO: 0.6 % (ref 0–1.5)
BILIRUB SERPL-MCNC: 0.7 MG/DL (ref 0–1.2)
BUN SERPL-MCNC: 19 MG/DL (ref 8–23)
BUN/CREAT SERPL: 29.2 (ref 7–25)
CALCIUM SPEC-SCNC: 9.8 MG/DL (ref 8.6–10.5)
CHLORIDE SERPL-SCNC: 102 MMOL/L (ref 98–107)
CO2 SERPL-SCNC: 26 MMOL/L (ref 22–29)
CREAT SERPL-MCNC: 0.65 MG/DL (ref 0.57–1)
D DIMER PPP FEU-MCNC: 0.47 MCGFEU/ML (ref 0–0.62)
DEPRECATED RDW RBC AUTO: 42.3 FL (ref 37–54)
EGFRCR SERPLBLD CKD-EPI 2021: 99.7 ML/MIN/1.73
EOSINOPHIL # BLD AUTO: 0.24 10*3/MM3 (ref 0–0.4)
EOSINOPHIL NFR BLD AUTO: 2.2 % (ref 0.3–6.2)
ERYTHROCYTE [DISTWIDTH] IN BLOOD BY AUTOMATED COUNT: 13.4 % (ref 12.3–15.4)
GEN 5 1HR TROPONIN T REFLEX: <6 NG/L
GLOBULIN UR ELPH-MCNC: 3.3 GM/DL
GLUCOSE SERPL-MCNC: 92 MG/DL (ref 65–99)
HCT VFR BLD AUTO: 40.1 % (ref 34–46.6)
HGB BLD-MCNC: 13.1 G/DL (ref 12–15.9)
IMM GRANULOCYTES # BLD AUTO: 0.03 10*3/MM3 (ref 0–0.05)
IMM GRANULOCYTES NFR BLD AUTO: 0.3 % (ref 0–0.5)
LIPASE SERPL-CCNC: 36 U/L (ref 13–60)
LYMPHOCYTES # BLD AUTO: 3.51 10*3/MM3 (ref 0.7–3.1)
LYMPHOCYTES NFR BLD AUTO: 32.3 % (ref 19.6–45.3)
MAGNESIUM SERPL-MCNC: 2.4 MG/DL (ref 1.6–2.4)
MCH RBC QN AUTO: 28.2 PG (ref 26.6–33)
MCHC RBC AUTO-ENTMCNC: 32.7 G/DL (ref 31.5–35.7)
MCV RBC AUTO: 86.4 FL (ref 79–97)
MONOCYTES # BLD AUTO: 0.85 10*3/MM3 (ref 0.1–0.9)
MONOCYTES NFR BLD AUTO: 7.8 % (ref 5–12)
NEUTROPHILS NFR BLD AUTO: 56.8 % (ref 42.7–76)
NEUTROPHILS NFR BLD AUTO: 6.19 10*3/MM3 (ref 1.7–7)
NRBC BLD AUTO-RTO: 0 /100 WBC (ref 0–0.2)
NT-PROBNP SERPL-MCNC: <36 PG/ML (ref 0–900)
PLATELET # BLD AUTO: 379 10*3/MM3 (ref 140–450)
PMV BLD AUTO: 8.5 FL (ref 6–12)
POTASSIUM SERPL-SCNC: 3.7 MMOL/L (ref 3.5–5.2)
PROT SERPL-MCNC: 7.8 G/DL (ref 6–8.5)
RBC # BLD AUTO: 4.64 10*6/MM3 (ref 3.77–5.28)
SODIUM SERPL-SCNC: 141 MMOL/L (ref 136–145)
TROPONIN T NUMERIC DELTA: NORMAL
TROPONIN T SERPL HS-MCNC: <6 NG/L
WBC NRBC COR # BLD AUTO: 10.88 10*3/MM3 (ref 3.4–10.8)

## 2025-04-17 PROCEDURE — 25010000002 FAMOTIDINE 10 MG/ML SOLUTION

## 2025-04-17 PROCEDURE — 85025 COMPLETE CBC W/AUTO DIFF WBC: CPT

## 2025-04-17 PROCEDURE — 96375 TX/PRO/DX INJ NEW DRUG ADDON: CPT

## 2025-04-17 PROCEDURE — 85379 FIBRIN DEGRADATION QUANT: CPT

## 2025-04-17 PROCEDURE — 71045 X-RAY EXAM CHEST 1 VIEW: CPT

## 2025-04-17 PROCEDURE — 96374 THER/PROPH/DIAG INJ IV PUSH: CPT

## 2025-04-17 PROCEDURE — 93010 ELECTROCARDIOGRAM REPORT: CPT | Performed by: INTERNAL MEDICINE

## 2025-04-17 PROCEDURE — 25010000002 ONDANSETRON PER 1 MG

## 2025-04-17 PROCEDURE — 84484 ASSAY OF TROPONIN QUANT: CPT

## 2025-04-17 PROCEDURE — 99284 EMERGENCY DEPT VISIT MOD MDM: CPT

## 2025-04-17 PROCEDURE — 93005 ELECTROCARDIOGRAM TRACING: CPT

## 2025-04-17 PROCEDURE — 83735 ASSAY OF MAGNESIUM: CPT

## 2025-04-17 PROCEDURE — 83690 ASSAY OF LIPASE: CPT

## 2025-04-17 PROCEDURE — 36415 COLL VENOUS BLD VENIPUNCTURE: CPT

## 2025-04-17 PROCEDURE — 80053 COMPREHEN METABOLIC PANEL: CPT

## 2025-04-17 PROCEDURE — 83880 ASSAY OF NATRIURETIC PEPTIDE: CPT

## 2025-04-17 RX ORDER — SODIUM CHLORIDE 0.9 % (FLUSH) 0.9 %
10 SYRINGE (ML) INJECTION AS NEEDED
Status: DISCONTINUED | OUTPATIENT
Start: 2025-04-17 | End: 2025-04-17 | Stop reason: HOSPADM

## 2025-04-17 RX ORDER — FAMOTIDINE 10 MG/ML
20 INJECTION, SOLUTION INTRAVENOUS ONCE
Status: COMPLETED | OUTPATIENT
Start: 2025-04-17 | End: 2025-04-17

## 2025-04-17 RX ORDER — ONDANSETRON 2 MG/ML
4 INJECTION INTRAMUSCULAR; INTRAVENOUS ONCE
Status: COMPLETED | OUTPATIENT
Start: 2025-04-17 | End: 2025-04-17

## 2025-04-17 RX ADMIN — ONDANSETRON 4 MG: 2 INJECTION INTRAMUSCULAR; INTRAVENOUS at 14:37

## 2025-04-17 RX ADMIN — FAMOTIDINE 20 MG: 10 INJECTION INTRAVENOUS at 14:37

## 2025-04-17 NOTE — ED PROVIDER NOTES
Subjective   History of Present Illness  Patient is a 60-year-old female who presents to the ER with complaints of chest pain.  Patient reports that she was getting out of the car to go eat lunch with her family around 12:30 PM.  States that she developed midsternal chest pain during this.  She states that it felt like a pressure-like sensation.  No radiation of pain.  No shortness of breath.  She reports that she took 5 antacids and it ultimately relieved the chest pain.  She states that right now she feels mildly uncomfortable in her midsternal region.  States that she feels like there is a lump in her throat.  She is complaining of nausea, but no episodes of vomiting.  No recent cough or fever.  No leg swelling.  Patient denies any past cardiac history.  No history of DVT or PE.  Past medical history of breast cancer, hyperlipidemia.        Review of Systems   Cardiovascular:  Positive for chest pain.   Gastrointestinal:  Positive for nausea.   All other systems reviewed and are negative.      Past Medical History:   Diagnosis Date    Breast cancer     Carcinoma in situ of female breast     Carcinosarcoma 2000    Carcinosarcoma     Heart murmur     Hyperlipidemia        Allergies   Allergen Reactions    Codeine Hallucinations     hallucinations       Past Surgical History:   Procedure Laterality Date    BREAST LUMPECTOMY Left     CERVICAL CONIZATION, LEEP       SECTION      x2    CHOLECYSTECTOMY      PORTACATH PLACEMENT      TONSILLECTOMY         Family History   Problem Relation Age of Onset    Breast cancer Maternal Aunt     No Known Problems Mother     No Known Problems Father     Heart attack Sister     Diabetes Sister     Diabetes Maternal Grandmother     Lung cancer Maternal Grandmother     Ovarian cancer Neg Hx     Uterine cancer Neg Hx     Colon cancer Neg Hx     Melanoma Neg Hx        Social History     Socioeconomic History    Marital status:    Tobacco Use    Smoking status: Never     Smokeless tobacco: Never   Substance and Sexual Activity    Alcohol use: No    Drug use: No    Sexual activity: Defer           Objective   Physical Exam  Vitals and nursing note reviewed.   Constitutional:       General: She is not in acute distress.     Appearance: She is well-developed and normal weight. She is not ill-appearing or toxic-appearing.   HENT:      Head: Normocephalic.   Cardiovascular:      Rate and Rhythm: Normal rate and regular rhythm.      Pulses: Normal pulses.      Heart sounds: Normal heart sounds.   Pulmonary:      Effort: Pulmonary effort is normal.      Breath sounds: Normal breath sounds.   Abdominal:      General: Abdomen is flat. Bowel sounds are normal. There is no distension.      Palpations: Abdomen is soft.      Tenderness: There is no abdominal tenderness.   Musculoskeletal:         General: Normal range of motion.      Cervical back: Normal range of motion and neck supple.   Skin:     General: Skin is warm and dry.   Neurological:      General: No focal deficit present.      Mental Status: She is alert and oriented to person, place, and time. Mental status is at baseline.   Psychiatric:         Mood and Affect: Mood normal.         Behavior: Behavior normal.         Procedures       Labs Reviewed   COMPREHENSIVE METABOLIC PANEL - Abnormal; Notable for the following components:       Result Value    BUN/Creatinine Ratio 29.2 (*)     All other components within normal limits    Narrative:     GFR Categories in Chronic Kidney Disease (CKD)      GFR Category          GFR (mL/min/1.73)    Interpretation  G1                     90 or greater         Normal or high (1)  G2                      60-89                Mild decrease (1)  G3a                   45-59                Mild to moderate decrease  G3b                   30-44                Moderate to severe decrease  G4                    15-29                Severe decrease  G5                    14 or less           Kidney  failure          (1)In the absence of evidence of kidney disease, neither GFR category G1 or G2 fulfill the criteria for CKD.    eGFR calculation 2021 CKD-EPI creatinine equation, which does not include race as a factor   CBC WITH AUTO DIFFERENTIAL - Abnormal; Notable for the following components:    WBC 10.88 (*)     Lymphocytes, Absolute 3.51 (*)     All other components within normal limits   LIPASE - Normal   BNP (IN-HOUSE) - Normal    Narrative:     This assay is used as an aid in the diagnosis of individuals suspected of having heart failure. It can be used as an aid in the diagnosis of acute decompensated heart failure (ADHF) in patients presenting with signs and symptoms of ADHF to the emergency department (ED). In addition, NT-proBNP of <300 pg/mL indicates ADHF is not likely.    Age Range Result Interpretation  NT-proBNP Concentration (pg/mL:      <50             Positive            >450                   Gray                 300-450                    Negative             <300    50-75           Positive            >900                  Gray                300-900                  Negative            <300      >75             Positive            >1800                  Gray                300-1800                  Negative            <300   D-DIMER, QUANTITATIVE - Normal    Narrative:     According to the assay 's published package insert, a normal (<0.50 MCGFEU/mL) D-dimer result in conjunction with a non-high clinical probability assessment, excludes deep vein thrombosis (DVT) and pulmonary embolism (PE) with high sensitivity.    D-dimer values increase with age and this can make VTE exclusion of an older population difficult. To address this, the [employer], based on best available evidence and recent guidelines, recommends that clinicians use age-adjusted D-dimer thresholds in patients greater than 50 years of age with: a) a low probability of PE who do not meet all  "Pulmonary Embolism Rule Out Criteria, or b) in those with intermediate probability of PE.   The formula for an age-adjusted D-dimer cut-off is \"age/100\".  For example, a 60 year old patient would have an age-adjusted cut-off of 0.60 MCGFEU/mL and an 80 year old 0.80 MCGFEU/mL.   TROPONIN - Normal    Narrative:     High Sensitive Troponin T Reference Range:  <14.0 ng/L- Negative Female for AMI  <22.0 ng/L- Negative Male for AMI  >=14 - Abnormal Female indicating possible myocardial injury.  >=22 - Abnormal Male indicating possible myocardial injury.   Clinicians would have to utilize clinical acumen, EKG, Troponin, and serial changes to determine if it is an Acute Myocardial Infarction or myocardial injury due to an underlying chronic condition.        MAGNESIUM - Normal   HIGH SENSITIVITIY TROPONIN T 1HR    Narrative:     High Sensitive Troponin T Reference Range:  <14.0 ng/L- Negative Female for AMI  <22.0 ng/L- Negative Male for AMI  >=14 - Abnormal Female indicating possible myocardial injury.  >=22 - Abnormal Male indicating possible myocardial injury.   Clinicians would have to utilize clinical acumen, EKG, Troponin, and serial changes to determine if it is an Acute Myocardial Infarction or myocardial injury due to an underlying chronic condition.        CBC AND DIFFERENTIAL    Narrative:     The following orders were created for panel order CBC & Differential.  Procedure                               Abnormality         Status                     ---------                               -----------         ------                     CBC Auto Differential[[id number]]        Abnormal            Final result                 Please view results for these tests on the individual orders.     XR Chest 1 View   Final Result       1. No active disease in the chest.       This report was signed and finalized on 4/17/2025 2:38 PM by Alberto Stevens.                  ED Course  ED Course as of 04/17/25 1627   Thu Apr 17, " 2025 1549 HEART Score for Major Cardiac Events - MDCalc  Calculated on Apr 17 2025 4:49 PM  2 points -> Low Score (0-3 points) Risk of MACE of 0.9-1.7%. [KR]      ED Course User Index  [KR] Valerie Mckeon APRJOSIANE                  HEART Score: 2                                      Medical Decision Making  Patient is a 60-year-old female who presents to the ER with complaints of chest pain.  Patient reports that she was getting out of the car to go eat lunch with her family around 12:30 PM.  States that she developed midsternal chest pain during this.  She states that it felt like a pressure-like sensation.  No radiation of pain.  No shortness of breath.  She reports that she took 5 antacids and it ultimately relieved the chest pain.  She states that right now she feels mildly uncomfortable in her midsternal region.  States that she feels like there is a lump in her throat.  She is complaining of nausea, but no episodes of vomiting.  No recent cough or fever.  No leg swelling.  Patient denies any past cardiac history.  No history of DVT or PE.  Past medical history of breast cancer, hyperlipidemia.    Patient was non-toxic appearing on arrival. Vital signs stable.     Patient's presentation raises suspicion for differentials including, but not limited to, ACS, GERD, anxiety.     External (non-ED) record review: None    Given this, patient was placed on the monitor. Laboratory studies and imaging studies were ordered including CBC, CMP, troponin, D-dimer, BNP, magnesium, lipase, EKG, chest x-ray.     Patient was given IV Pepcid, IV Zofran for symptomatic relief.    Imaging was reviewed by radiologist. Please refer to above section for results that were interpreted by radiologist.    Decision rules/scores evaluated: Heart score 2.  Troponin x 2 unremarkable.  EKG with no acute findings.  Low suspicion for ACS.  Wells score low risk for PE.  D-dimer reviewed and is unremarkable.  No hypoxia or tachycardia noted.  Do not  suspect PE.    Labs were reviewed and interpreted. Please refer to above section for results.    On re-evaluation, patient remained hemodynamically stable and appeared to be comfortable and in no acute distress.  On re-evaluation, patient reported that she was no longer having any pain and felt comfortable.  Chest x-ray negative for acute findings.  Do feel that pain could likely be related to acid reflux.  Lipase normal, do not suspect pancreatitis.      I discussed all of the lab and imaging results with the patient during this visit in the emergency department. I answered all the questions regarding the emergency department evaluation, diagnosis, and treatment plan. We talked about how crucial it is for the patient to follow up by calling their primary care provider as soon as possible to schedule an appointment for within the next few days or as soon as possible so that the symptoms can be reassessed to see if they have improved or to answer any additional questions. I also provided the patient with advice on returning safely and urged the patient to visit the emergency department right away if any worsening or new symptoms appeared. The patient verbalized understanding of the discharge instructions and agreed with them. Rebekah was discharged in stable condition.    Signed by:   BARRERA Barney 4/17/2025 16:25 CDT     Dragon disclaimer:  Part of this note may be an electronic transcription/translation of spoken language to printed text using the Dragon Dictation System.    Problems Addressed:  Chest pain, unspecified type: acute illness or injury    Amount and/or Complexity of Data Reviewed  Labs: ordered.  Radiology: ordered.  ECG/medicine tests: ordered.    Risk  Prescription drug management.        Final diagnoses:   Chest pain, unspecified type       ED Disposition  ED Disposition       ED Disposition   Discharge    Condition   Stable    Comment   --               Silvino Lopez,   [address]  [place] [address]  [phone]    Schedule an appointment as soon as possible for a visit in 1 day      [facility]  [address] [place] [id number] [phone]  Go to   If symptoms worsen         Medication List      No changes were made to your prescriptions during this visit.            Valerie Mckeon, APRN  04/17/25 [id number]

## 2025-04-17 NOTE — DISCHARGE INSTRUCTIONS
It was very nice to meet you, Rebekah. Thank you for allowing us to take care of you today at [facility].    Please understand that an ER evaluation is just the start of your evaluation. We will do what we can, but we are often unable to fully figure out what is causing your symptoms from one evaluation. Thus, our primary goal is to determine whether you need to be evaluated in the hospital or if it is safe for you to go home and see other doctors such as a primary care physician or a specialist on an outpatient basis.     Like we discussed, it is VERY IMPORTANT that you follow up with your primary care doctor (call them to set up an appointment) within the next few days or as soon as possible so that you can be re-evaluated for improvement in your symptoms or for any other questions.     Please return to the emergency room within 12-48 hours if you experience any new or worsening symptoms.

## 2025-04-20 LAB
QT INTERVAL: 384 MS
QT INTERVAL: 418 MS
QTC INTERVAL: 411 MS
QTC INTERVAL: 441 MS

## 2025-07-16 ENCOUNTER — HOSPITAL ENCOUNTER (OUTPATIENT)
Dept: INFUSION THERAPY | Age: 60
Discharge: HOME | End: 2025-07-16
Payer: COMMERCIAL

## 2025-07-16 DIAGNOSIS — Z85.3 PERSONAL HISTORY OF BREAST CANCER: ICD-10-CM

## 2025-07-16 DIAGNOSIS — R97.8 ELEVATED CA 15-3 LEVEL: ICD-10-CM

## 2025-07-16 LAB
ALBUMIN SERPL-MCNC: 4.3 G/DL (ref 3.5–5.2)
ALP SERPL-CCNC: 80 U/L (ref 35–104)
ALT SERPL-CCNC: 22 U/L (ref 5–33)
ANION GAP SERPL CALCULATED.3IONS-SCNC: 12 MMOL/L (ref 7–19)
AST SERPL-CCNC: 26 U/L (ref 5–32)
BASOPHILS # BLD: 0.06 K/UL (ref 0–0.2)
BASOPHILS NFR BLD: 0.8 % (ref 0–1)
BILIRUB SERPL-MCNC: 1.1 MG/DL (ref 0–1.2)
BUN SERPL-MCNC: 17 MG/DL (ref 8–23)
CA 15-3: 27 U/ML (ref 0–25)
CALCIUM SERPL-MCNC: 9.9 MG/DL (ref 8.8–10.2)
CEA SERPL-MCNC: 1.7 NG/ML (ref 0–4.7)
CHLORIDE SERPL-SCNC: 103 MMOL/L (ref 98–107)
CO2 SERPL-SCNC: 25 MMOL/L (ref 22–29)
CREAT SERPL-MCNC: 0.7 MG/DL (ref 0.5–0.9)
EOSINOPHIL # BLD: 0.1 K/UL (ref 0–0.6)
EOSINOPHIL NFR BLD: 1.4 % (ref 0–5)
ERYTHROCYTE [DISTWIDTH] IN BLOOD BY AUTOMATED COUNT: 12.8 % (ref 11.5–14.5)
GLUCOSE SERPL-MCNC: 105 MG/DL (ref 70–99)
HCT VFR BLD AUTO: 38.5 % (ref 37–47)
HGB BLD-MCNC: 12.8 G/DL (ref 12–16)
LYMPHOCYTES # BLD: 2.27 K/UL (ref 1.1–4.5)
LYMPHOCYTES NFR BLD: 30.8 % (ref 20–40)
MCH RBC QN AUTO: 29 PG (ref 27–31)
MCHC RBC AUTO-ENTMCNC: 33.2 G/DL (ref 33–37)
MCV RBC AUTO: 87.1 FL (ref 81–99)
MONOCYTES # BLD: 0.62 K/UL (ref 0–0.9)
MONOCYTES NFR BLD: 8.4 % (ref 1–10)
NEUTROPHILS # BLD: 4.3 K/UL (ref 1.5–7.5)
NEUTS SEG NFR BLD: 58.5 % (ref 50–65)
PLATELET # BLD AUTO: 325 K/UL (ref 130–400)
PMV BLD AUTO: 8.7 FL (ref 9.4–12.3)
POTASSIUM SERPL-SCNC: 4.3 MMOL/L (ref 3.5–5.1)
PROT SERPL-MCNC: 7.2 G/DL (ref 6.4–8.3)
RBC # BLD AUTO: 4.42 M/UL (ref 4.2–5.4)
SODIUM SERPL-SCNC: 140 MMOL/L (ref 136–145)
WBC # BLD AUTO: 7.36 K/UL (ref 4.8–10.8)

## 2025-07-16 PROCEDURE — 85025 COMPLETE CBC W/AUTO DIFF WBC: CPT

## 2025-07-16 PROCEDURE — 82378 CARCINOEMBRYONIC ANTIGEN: CPT

## 2025-07-16 PROCEDURE — 80053 COMPREHEN METABOLIC PANEL: CPT

## 2025-07-16 PROCEDURE — 36415 COLL VENOUS BLD VENIPUNCTURE: CPT

## 2025-07-16 PROCEDURE — 86300 IMMUNOASSAY TUMOR CA 15-3: CPT

## 2025-07-18 ENCOUNTER — TELEPHONE (OUTPATIENT)
Dept: HEMATOLOGY | Age: 60
End: 2025-07-18

## 2025-07-18 NOTE — TELEPHONE ENCOUNTER
I called patient and reminded patient of their appt on 07/23/2025 and patient confirmed they would be here. I also let patient know that we have moved into our new cancer facility and asked patient if they were aware of where we were now located, and patient voiced understanding of our new location. Patient knows not to arrive any earlier their appointment because we are unable to check patients in early and to come in well hydrated incase labs are needed at any time throughout their visit.

## 2025-07-22 NOTE — PROGRESS NOTES
Progress Note      Pt Name: Violet Perla  YOB: 1965  MRN: [id number]    Date of evaluation: 7/23/2025  PCP: Dr. 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 She was treated with a left lumpectomy and chemoradiation. She completed serology prior to today's office visit and is here to discuss the results. She is monitored for a history of chronically elevated CA 15-3, which has remained normal for the past year. She is monitored for a history of intermittently elevated CA 15-3.    She reports no new health issues or complaints at present. She has not noticed any new lumps or bumps in her breasts. However, she mentions that her scar is dry and requires additional padding in her bra for comfort. She notes that the affected side does not feel the same as the other, which she finds slightly bothersome. She is scheduled for a mammogram and bone density test today at 1:00 PM at [facility].    Since her last evaluation, she was seen at [facility] ER 04/17/2025 for chest pain, which was ultimately relieved by antacids, IV Pepcid and Zofran. A chest x-ray showed no active disease, troponin levels were negative twice, EKG showed no acute findings, and D-dimer was unremarkable. She has not experienced any further episodes.    PAST SURGICAL HISTORY:  Status post left lumpectomy 12/21/2000.  Status post adjuvant chemotherapy and radiation.    TARGET BREAST CANCER SITES:  Left lumpectomy 12/21/00.  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

## 2025-07-23 ENCOUNTER — OFFICE VISIT (OUTPATIENT)
Dept: HEMATOLOGY | Age: 60
End: 2025-07-23
Payer: COMMERCIAL

## 2025-07-23 ENCOUNTER — HOSPITAL ENCOUNTER (OUTPATIENT)
Dept: INFUSION THERAPY | Age: 60
Discharge: HOME | End: 2025-07-23
Payer: COMMERCIAL

## 2025-07-23 VITALS
HEART RATE: 64 BPM | OXYGEN SATURATION: 100 % | BODY MASS INDEX: 25.92 KG/M2 | SYSTOLIC BLOOD PRESSURE: 126 MMHG | HEIGHT: 61 IN | WEIGHT: 137.3 LBS | TEMPERATURE: 97.6 F | DIASTOLIC BLOOD PRESSURE: 80 MMHG

## 2025-07-23 DIAGNOSIS — R97.8 ELEVATED CA 15-3 LEVEL: ICD-10-CM

## 2025-07-23 DIAGNOSIS — Z85.3 ENCOUNTER FOR FOLLOW-UP SURVEILLANCE OF BREAST CANCER: Primary | ICD-10-CM

## 2025-07-23 DIAGNOSIS — Z08 ENCOUNTER FOR FOLLOW-UP SURVEILLANCE OF BREAST CANCER: Primary | ICD-10-CM

## 2025-07-23 DIAGNOSIS — Z85.3 PERSONAL HISTORY OF BREAST CANCER: ICD-10-CM

## 2025-07-23 DIAGNOSIS — Z71.89 CARE PLAN DISCUSSED WITH PATIENT: ICD-10-CM

## 2025-07-23 PROCEDURE — 99214 OFFICE O/P EST MOD 30 MIN: CPT | Performed by: NURSE PRACTITIONER

## 2025-07-23 PROCEDURE — 99212 OFFICE O/P EST SF 10 MIN: CPT

## 2025-07-23 ASSESSMENT — ENCOUNTER SYMPTOMS
DIARRHEA: 0
SORE THROAT: 0
CONSTIPATION: 0
SHORTNESS OF BREATH: 0
COUGH: 0
NAUSEA: 0
VOMITING: 0
ABDOMINAL PAIN: 0
WHEEZING: 0
EYE DISCHARGE: 0
TROUBLE SWALLOWING: 0
EYE ITCHING: 0

## 2025-07-25 DIAGNOSIS — Z85.3 PERSONAL HISTORY OF BREAST CANCER: Primary | ICD-10-CM

## 2025-07-25 DIAGNOSIS — R92.8 ABNORMAL ULTRASOUND OF BREAST: ICD-10-CM

## 2025-07-29 ENCOUNTER — OFFICE VISIT (OUTPATIENT)
Dept: SURGERY | Age: 60
End: 2025-07-29
Payer: COMMERCIAL

## 2025-07-29 VITALS
OXYGEN SATURATION: 98 % | TEMPERATURE: 98 F | WEIGHT: 136 LBS | BODY MASS INDEX: 25.68 KG/M2 | HEART RATE: 76 BPM | HEIGHT: 61 IN

## 2025-07-29 DIAGNOSIS — R45.89 ANXIETY ABOUT HEALTH: ICD-10-CM

## 2025-07-29 DIAGNOSIS — R97.8 ELEVATED TUMOR MARKERS: ICD-10-CM

## 2025-07-29 DIAGNOSIS — N60.01 CYST OF RIGHT BREAST: Primary | ICD-10-CM

## 2025-07-29 DIAGNOSIS — C80.1 CARCINOSARCOMA (HCC): ICD-10-CM

## 2025-07-29 DIAGNOSIS — Z85.3 PERSONAL HISTORY OF BREAST CANCER: ICD-10-CM

## 2025-07-29 PROCEDURE — 99213 OFFICE O/P EST LOW 20 MIN: CPT | Performed by: SURGERY

## 2025-07-29 RX ORDER — DIAZEPAM 5 MG/1
5 TABLET ORAL ONCE
Qty: 1 TABLET | Refills: 0 | Status: SHIPPED | OUTPATIENT
Start: 2025-07-29 | End: 2025-07-29

## 2025-07-29 RX ORDER — OXYBUTYNIN CHLORIDE 10 MG/1
10 TABLET, EXTENDED RELEASE ORAL DAILY
COMMUNITY
Start: 2025-07-17

## 2025-07-29 RX ORDER — PERFLUOROHEXYLOCTANE 1 MG/MG
SOLUTION OPHTHALMIC
COMMUNITY
Start: 2025-06-12

## 2025-07-29 NOTE — PROGRESS NOTES
facility-administered medications for this visit.     Allergies: Codeine    Family History   Problem Relation Age of Onset    Other Mother         passed in tornado    Cancer Father         non-hodgkins lymphoma    Other Sister         cholecystitis    Cancer Maternal Aunt         breast    Diabetes Maternal Grandmother     Cancer Maternal Grandmother         lung    Diabetes Maternal Grandfather     Heart Disease Maternal Grandfather     Unknown Paternal Grandmother     Unknown Paternal Grandfather     Cancer Half-Sister        Social History     Tobacco Use    Smoking status: Never     Passive exposure: Never    Smokeless tobacco: Never   Substance Use Topics    Alcohol use: No       Review of Systems    OBJECTIVE:  Pulse 76   Temp 98 °F (36.7 °C) (Temporal)   Ht 1.549 m (5' 1\")   Wt 61.7 kg (136 lb)   LMP 09/18/2019   SpO2 98%   BMI 25.70 kg/m²   Physical Exam  Vitals reviewed. Exam conducted with a chaperone present.   Constitutional:       General: She is not in acute distress.     Appearance: Normal appearance. She is well-developed and normal weight. She is not ill-appearing.   HENT:      Head: Normocephalic and atraumatic.   Eyes:      Pupils: Pupils are equal, round, and reactive to light.   Cardiovascular:      Rate and Rhythm: Normal rate and regular rhythm.   Pulmonary:      Effort: Pulmonary effort is normal.      Breath sounds: Normal breath sounds. No wheezing or rales.   Chest:   Breasts:     Right: Normal. No inverted nipple, mass, nipple discharge, skin change or tenderness.      Left: Normal. No inverted nipple, mass, nipple discharge, skin change or tenderness.          Comments: Contracture to left axillary scar. No palpable lesions.  Abdominal:      General: Bowel sounds are normal. There is no distension.      Palpations: Abdomen is soft.   Musculoskeletal:         General: Normal range of motion.      Cervical back: Normal range of motion and neck supple.   Lymphadenopathy:

## 2025-07-30 DIAGNOSIS — R45.89 ANXIETY ABOUT HEALTH: ICD-10-CM

## 2025-07-30 DIAGNOSIS — N60.01 CYST OF RIGHT BREAST: Primary | ICD-10-CM

## 2025-07-30 DIAGNOSIS — Z85.3 PERSONAL HISTORY OF BREAST CANCER: ICD-10-CM

## 2025-08-01 ENCOUNTER — HOSPITAL ENCOUNTER (OUTPATIENT)
Dept: WOMENS IMAGING | Age: 60
Discharge: HOME | End: 2025-08-01
Payer: COMMERCIAL

## 2025-08-01 ENCOUNTER — APPOINTMENT (OUTPATIENT)
Dept: WOMENS IMAGING | Age: 60
End: 2025-08-01
Payer: COMMERCIAL

## 2025-08-01 DIAGNOSIS — R45.89 ANXIETY ABOUT HEALTH: ICD-10-CM

## 2025-08-01 DIAGNOSIS — N60.01 CYST OF RIGHT BREAST: ICD-10-CM

## 2025-08-01 DIAGNOSIS — Z85.3 PERSONAL HISTORY OF BREAST CANCER: ICD-10-CM

## 2025-08-01 PROCEDURE — 76642 ULTRASOUND BREAST LIMITED: CPT

## 2025-08-06 ENCOUNTER — RESULTS FOLLOW-UP (OUTPATIENT)
Dept: SURGERY | Age: 60
End: 2025-08-06

## 2025-08-06 DIAGNOSIS — Z12.31 SCREENING MAMMOGRAM FOR BREAST CANCER: Primary | ICD-10-CM

## 2025-09-01 ENCOUNTER — APPOINTMENT (OUTPATIENT)
Dept: GENERAL RADIOLOGY | Age: 60
End: 2025-09-01
Payer: OTHER MISCELLANEOUS

## 2025-09-01 ENCOUNTER — APPOINTMENT (OUTPATIENT)
Dept: CT IMAGING | Age: 60
End: 2025-09-01
Payer: OTHER MISCELLANEOUS

## 2025-09-01 ENCOUNTER — HOSPITAL ENCOUNTER (EMERGENCY)
Age: 60
Discharge: HOME | End: 2025-09-01
Payer: OTHER MISCELLANEOUS

## 2025-09-01 VITALS
RESPIRATION RATE: 18 BRPM | TEMPERATURE: 98.8 F | HEART RATE: 88 BPM | OXYGEN SATURATION: 98 % | DIASTOLIC BLOOD PRESSURE: 80 MMHG | SYSTOLIC BLOOD PRESSURE: 157 MMHG
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
## 2025-09-01 PROCEDURE — 99283 EMERGENCY DEPT VISIT LOW MDM: CPT

## 2025-09-01 PROCEDURE — 72040 X-RAY EXAM NECK SPINE 2-3 VW: CPT

## 2025-09-01 PROCEDURE — 73130 X-RAY EXAM OF HAND: CPT

## 2025-09-01 PROCEDURE — 72100 X-RAY EXAM L-S SPINE 2/3 VWS: CPT

## 2025-09-01 RX ORDER — LORAZEPAM 1 MG/1
1 TABLET ORAL ONCE
Status: DISCONTINUED | OUTPATIENT
Start: 2025-09-01 | End: 2025-09-01

## 2025-09-01 ASSESSMENT — ENCOUNTER SYMPTOMS: RESPIRATORY NEGATIVE: 1

## 2025-09-01 ASSESSMENT — PAIN SCALES - GENERAL: PAINLEVEL_OUTOF10: 6
